# Patient Record
Sex: FEMALE | Race: WHITE | HISPANIC OR LATINO | Employment: OTHER | URBAN - METROPOLITAN AREA
[De-identification: names, ages, dates, MRNs, and addresses within clinical notes are randomized per-mention and may not be internally consistent; named-entity substitution may affect disease eponyms.]

---

## 2017-05-12 ENCOUNTER — RESOLUTE PROFESSIONAL BILLING HOSPITAL PROF FEE (OUTPATIENT)
Dept: HOSPITALIST | Facility: MEDICAL CENTER | Age: 82
End: 2017-05-12
Payer: MEDICARE

## 2017-05-12 ENCOUNTER — HOSPITAL ENCOUNTER (INPATIENT)
Facility: MEDICAL CENTER | Age: 82
LOS: 6 days | DRG: 673 | End: 2017-05-18
Attending: EMERGENCY MEDICINE | Admitting: HOSPITALIST
Payer: MEDICARE

## 2017-05-12 DIAGNOSIS — N18.9 CRF (CHRONIC RENAL FAILURE), UNSPECIFIED STAGE: ICD-10-CM

## 2017-05-12 PROBLEM — I10 HTN (HYPERTENSION): Status: ACTIVE | Noted: 2017-05-12

## 2017-05-12 PROBLEM — D61.818 PANCYTOPENIA (HCC): Status: ACTIVE | Noted: 2017-05-12

## 2017-05-12 PROBLEM — N18.6 ESRD ON HEMODIALYSIS (HCC): Status: ACTIVE | Noted: 2017-05-12

## 2017-05-12 PROBLEM — Z99.2 ESRD ON HEMODIALYSIS (HCC): Status: ACTIVE | Noted: 2017-05-12

## 2017-05-12 LAB
ALBUMIN SERPL BCP-MCNC: 3.6 G/DL (ref 3.2–4.9)
ALBUMIN/GLOB SERPL: 1.1 G/DL
ALP SERPL-CCNC: 93 U/L (ref 30–99)
ALT SERPL-CCNC: 17 U/L (ref 2–50)
ANION GAP SERPL CALC-SCNC: 12 MMOL/L (ref 0–11.9)
AST SERPL-CCNC: 18 U/L (ref 12–45)
BASOPHILS # BLD AUTO: 0.9 % (ref 0–1.8)
BASOPHILS # BLD: 0.02 K/UL (ref 0–0.12)
BILIRUB SERPL-MCNC: 0.4 MG/DL (ref 0.1–1.5)
BUN SERPL-MCNC: 113 MG/DL (ref 8–22)
CALCIUM SERPL-MCNC: 9.5 MG/DL (ref 8.5–10.5)
CHLORIDE SERPL-SCNC: 106 MMOL/L (ref 96–112)
CO2 SERPL-SCNC: 19 MMOL/L (ref 20–33)
CREAT SERPL-MCNC: 5.84 MG/DL (ref 0.5–1.4)
EOSINOPHIL # BLD AUTO: 0.11 K/UL (ref 0–0.51)
EOSINOPHIL NFR BLD: 4.4 % (ref 0–6.9)
ERYTHROCYTE [DISTWIDTH] IN BLOOD BY AUTOMATED COUNT: 47.7 FL (ref 35.9–50)
EST. AVERAGE GLUCOSE BLD GHB EST-MCNC: 134 MG/DL
GFR SERPL CREATININE-BSD FRML MDRD: 7 ML/MIN/1.73 M 2
GLOBULIN SER CALC-MCNC: 3.2 G/DL (ref 1.9–3.5)
GLUCOSE BLD-MCNC: 160 MG/DL (ref 65–99)
GLUCOSE SERPL-MCNC: 162 MG/DL (ref 65–99)
HAV IGM SERPL QL IA: NEGATIVE
HBA1C MFR BLD: 6.3 % (ref 0–5.6)
HBV CORE IGM SER QL: NEGATIVE
HBV SURFACE AB SERPL IA-ACNC: <3.1 MIU/ML (ref 0–10)
HBV SURFACE AG SER QL: NEGATIVE
HCT VFR BLD AUTO: 36.2 % (ref 37–47)
HCV AB SER QL: NEGATIVE
HGB BLD-MCNC: 11.6 G/DL (ref 12–16)
LYMPHOCYTES # BLD AUTO: 0.46 K/UL (ref 1–4.8)
LYMPHOCYTES NFR BLD: 19.1 % (ref 22–41)
MANUAL DIFF BLD: NORMAL
MCH RBC QN AUTO: 30.9 PG (ref 27–33)
MCHC RBC AUTO-ENTMCNC: 32 G/DL (ref 33.6–35)
MCV RBC AUTO: 96.5 FL (ref 81.4–97.8)
MONOCYTES # BLD AUTO: 0.19 K/UL (ref 0–0.85)
MONOCYTES NFR BLD AUTO: 7.8 % (ref 0–13.4)
MORPHOLOGY BLD-IMP: NORMAL
NEUTROPHILS # BLD AUTO: 1.63 K/UL (ref 2–7.15)
NEUTROPHILS NFR BLD: 67.8 % (ref 44–72)
NRBC # BLD AUTO: 0 K/UL
NRBC BLD AUTO-RTO: 0 /100 WBC
PHOSPHATE SERPL-MCNC: 5.4 MG/DL (ref 2.5–4.5)
PLATELET # BLD AUTO: 34 K/UL (ref 164–446)
PLATELET BLD QL SMEAR: NORMAL
PLATELETS.RETICULATED NFR BLD AUTO: 9.3 K/UL (ref 0.6–13.1)
PMV BLD AUTO: 13.7 FL (ref 9–12.9)
POTASSIUM SERPL-SCNC: 5.5 MMOL/L (ref 3.6–5.5)
PROT SERPL-MCNC: 6.8 G/DL (ref 6–8.2)
RBC # BLD AUTO: 3.75 M/UL (ref 4.2–5.4)
RBC BLD AUTO: NORMAL
SODIUM SERPL-SCNC: 137 MMOL/L (ref 135–145)
WBC # BLD AUTO: 2.4 K/UL (ref 4.8–10.8)

## 2017-05-12 PROCEDURE — 5A1D60Z PERFORMANCE OF URINARY FILTRATION, MULTIPLE: ICD-10-PCS | Performed by: INTERNAL MEDICINE

## 2017-05-12 PROCEDURE — 86706 HEP B SURFACE ANTIBODY: CPT

## 2017-05-12 PROCEDURE — 83036 HEMOGLOBIN GLYCOSYLATED A1C: CPT

## 2017-05-12 PROCEDURE — 82962 GLUCOSE BLOOD TEST: CPT

## 2017-05-12 PROCEDURE — 85027 COMPLETE CBC AUTOMATED: CPT

## 2017-05-12 PROCEDURE — 85055 RETICULATED PLATELET ASSAY: CPT

## 2017-05-12 PROCEDURE — 86480 TB TEST CELL IMMUN MEASURE: CPT

## 2017-05-12 PROCEDURE — 80074 ACUTE HEPATITIS PANEL: CPT

## 2017-05-12 PROCEDURE — 770006 HCHG ROOM/CARE - MED/SURG/GYN SEMI*

## 2017-05-12 PROCEDURE — 700101 HCHG RX REV CODE 250: Performed by: INTERNAL MEDICINE

## 2017-05-12 PROCEDURE — 99285 EMERGENCY DEPT VISIT HI MDM: CPT

## 2017-05-12 PROCEDURE — 36415 COLL VENOUS BLD VENIPUNCTURE: CPT

## 2017-05-12 PROCEDURE — 700102 HCHG RX REV CODE 250 W/ 637 OVERRIDE(OP): Performed by: HOSPITALIST

## 2017-05-12 PROCEDURE — 84100 ASSAY OF PHOSPHORUS: CPT

## 2017-05-12 PROCEDURE — 85007 BL SMEAR W/DIFF WBC COUNT: CPT

## 2017-05-12 PROCEDURE — 90935 HEMODIALYSIS ONE EVALUATION: CPT

## 2017-05-12 PROCEDURE — 99223 1ST HOSP IP/OBS HIGH 75: CPT | Mod: AI | Performed by: HOSPITALIST

## 2017-05-12 PROCEDURE — 80053 COMPREHEN METABOLIC PANEL: CPT

## 2017-05-12 RX ORDER — HEPARIN SODIUM 5000 [USP'U]/ML
5000 INJECTION, SOLUTION INTRAVENOUS; SUBCUTANEOUS EVERY 8 HOURS
Status: DISCONTINUED | OUTPATIENT
Start: 2017-05-12 | End: 2017-05-12

## 2017-05-12 RX ORDER — VENLAFAXINE 37.5 MG/1
37.5 TABLET ORAL
COMMUNITY

## 2017-05-12 RX ORDER — AMOXICILLIN 250 MG
2 CAPSULE ORAL 2 TIMES DAILY
Status: DISCONTINUED | OUTPATIENT
Start: 2017-05-12 | End: 2017-05-18 | Stop reason: HOSPADM

## 2017-05-12 RX ORDER — DEXTROSE MONOHYDRATE 25 G/50ML
25 INJECTION, SOLUTION INTRAVENOUS
Status: DISCONTINUED | OUTPATIENT
Start: 2017-05-12 | End: 2017-05-18 | Stop reason: HOSPADM

## 2017-05-12 RX ORDER — HEPARIN SODIUM 1000 [USP'U]/ML
1750 INJECTION, SOLUTION INTRAVENOUS; SUBCUTANEOUS
Status: DISCONTINUED | OUTPATIENT
Start: 2017-05-12 | End: 2017-05-12

## 2017-05-12 RX ORDER — LORAZEPAM 2 MG/ML
0.5 INJECTION INTRAMUSCULAR EVERY 6 HOURS PRN
Status: DISCONTINUED | OUTPATIENT
Start: 2017-05-12 | End: 2017-05-18 | Stop reason: HOSPADM

## 2017-05-12 RX ORDER — POLYETHYLENE GLYCOL 3350 17 G/17G
1 POWDER, FOR SOLUTION ORAL
Status: DISCONTINUED | OUTPATIENT
Start: 2017-05-12 | End: 2017-05-18 | Stop reason: HOSPADM

## 2017-05-12 RX ORDER — ACETAMINOPHEN 325 MG/1
650 TABLET ORAL EVERY 6 HOURS PRN
Status: DISCONTINUED | OUTPATIENT
Start: 2017-05-12 | End: 2017-05-18 | Stop reason: HOSPADM

## 2017-05-12 RX ORDER — LOSARTAN POTASSIUM 100 MG/1
100 TABLET ORAL DAILY
COMMUNITY
End: 2017-05-25 | Stop reason: SDUPTHER

## 2017-05-12 RX ORDER — ONDANSETRON 4 MG/1
4 TABLET, ORALLY DISINTEGRATING ORAL EVERY 4 HOURS PRN
Status: DISCONTINUED | OUTPATIENT
Start: 2017-05-12 | End: 2017-05-18 | Stop reason: HOSPADM

## 2017-05-12 RX ORDER — BISACODYL 10 MG
10 SUPPOSITORY, RECTAL RECTAL
Status: DISCONTINUED | OUTPATIENT
Start: 2017-05-12 | End: 2017-05-18 | Stop reason: HOSPADM

## 2017-05-12 RX ORDER — ONDANSETRON 2 MG/ML
4 INJECTION INTRAMUSCULAR; INTRAVENOUS EVERY 4 HOURS PRN
Status: DISCONTINUED | OUTPATIENT
Start: 2017-05-12 | End: 2017-05-18 | Stop reason: HOSPADM

## 2017-05-12 RX ORDER — ENALAPRILAT 1.25 MG/ML
1.25 INJECTION INTRAVENOUS EVERY 6 HOURS PRN
Status: DISCONTINUED | OUTPATIENT
Start: 2017-05-12 | End: 2017-05-13

## 2017-05-12 RX ORDER — LORAZEPAM 0.5 MG/1
0.5 TABLET ORAL EVERY 6 HOURS PRN
Status: DISCONTINUED | OUTPATIENT
Start: 2017-05-12 | End: 2017-05-18 | Stop reason: HOSPADM

## 2017-05-12 RX ADMIN — DEXTROSE MONOHYDRATE, SODIUM CITRATE, AND CITRIC ACID MONOHYDRATE: 2.45; 2.2; .8 INJECTION, SOLUTION INTRAVENOUS at 23:00

## 2017-05-12 ASSESSMENT — PAIN SCALES - GENERAL: PAINLEVEL_OUTOF10: 0

## 2017-05-12 NOTE — IP AVS SNAPSHOT
5/18/2017    Marcie Mccartney  [address]  [facility] [address]    Dear Ma:    [facility] wants to ensure your discharge home is safe and you or your loved ones have had all of your questions answered regarding your care after you leave the hospital.    Below is a list of resources and contact information should you have any questions regarding your hospital stay, follow-up instructions, or active medical symptoms.    Questions or Concerns Regarding… Contact   Medical Questions Related to Your Discharge  (7 days a week, 8am-5pm) Contact a Nurse Care Coordinator   [phone]   Medical Questions Not Related to Your Discharge  (24 hours a day / 7 days a week)  Contact the Nurse Health Line   [phone]    Medications or Discharge Instructions Refer to your discharge packet   or contact your [facility] Primary Care Provider   [phone]   Follow-up Appointment(s) Schedule your appointment via [employer]   or contact Scheduling [phone]   Billing Review your statement via [employer]  or contact Billing [phone]   Medical Records Review your records via [employer]   or contact Medical Records [phone]     You may receive a telephone call within two days of discharge. This call is to make certain you understand your discharge instructions and have the opportunity to have any questions answered. You can also easily access your medical information, test results and upcoming appointments via the [employer] free online health management tool. You can learn more and sign up at [employer]. For assistance setting up your [employer] account, please call [phone].    Once again, we want to ensure your discharge home is safe and that you have a clear understanding of any next steps in your care. If you have any questions or concerns, please do not hesitate to contact us, we are here for you. Thank you for choosing [facility] for your healthcare needs.    Sincerely,    Your [facility] Healthcare Team

## 2017-05-12 NOTE — ED NOTES
Pt to [facility] with family.  Pt reports pt is moving up here from [facility] and that she has not had dialysis in 1-2 weeks.

## 2017-05-12 NOTE — IP AVS SNAPSHOT
" <p align=\"LEFT\"><IMG SRC=\"//EMRWB/blob$/Images/[email or web address]\" alt=\"Image\" WIDTH=\"50%\" HEIGHT=\"200\" BORDER=\"\"></p>                   Name:Marcie Mccartney  Medical Record Number:[id number]  CSN: [id number]    YOB: 1934   Age: 83 y.o.  Sex: female  HT:1.524 m (5') WT: 69.5 kg (153 lb 3.5 oz)          Admit Date: 5/12/2017     Discharge Date:   Today's Date: 5/18/2017  Attending Doctor:  Malvin Shaffer M.D.                  Allergies:  Review of patient's allergies indicates no known allergies.          Follow-up Information     1. Follow up with Pcp Pt States None In 1 week.    Specialty:  Family Medicine        2. Follow up with Piper Holman.    Why:  First appt,  on 5/19/2015, arrive at 2:45pm for a 3:15 appt.    Contact information    [address] [place] [address]  [phone]         Medication List      Take these Medications        Instructions    losartan 100 MG Tabs   Commonly known as:  COZAAR    Take 100 mg by mouth every day.   Dose:  100 mg       venlafaxine 37.5 MG Tabs   Commonly known as:  EFFEXOR    Take 37.5 mg by mouth every bedtime.   Dose:  37.5 mg         "

## 2017-05-12 NOTE — IP AVS SNAPSHOT
Obsorb Access Code: [email or web address]  Expires: 6/17/2017  3:07 PM    Your email address is not on file at [facility].  Email Addresses are required for you to sign up for Obsorb, please contact [phone] to verify your personal information and to provide your email address prior to attempting to register for Obsorb.    Marcie Mccartney  [address] [place] [address], [place] [address]    Obsorb  A secure, online tool to manage your health information     [employer]’s Obsorb® is a secure, online tool that connects you to your personalized health information from the privacy of your home -- day or night - making it very easy for you to manage your healthcare. Once the activation process is completed, you can even access your medical information using the Obsorb toribio, which is available for free in the Apple Toribio store or Google Play store.     To learn more about Obsorb, visit [email or web address]    There are two levels of access available (as shown below):   My Chart Features  [facility] Primary Care Doctor [facility]  Specialists [facility]  Urgent  Care Non-[facility] Primary Care Doctor   Email your healthcare team securely and privately 24/7 X X X    Manage appointments: schedule your next appointment; view details of past/upcoming appointments X      Request prescription refills. X      View recent personal medical records, including lab and immunizations X X X X   View health record, including health history, allergies, medications X X X X   Read reports about your outpatient visits, procedures, consult and ER notes X X X X   See your discharge summary, which is a recap of your hospital and/or ER visit that includes your diagnosis, lab results, and care plan X X  X     How to register for [employer]:  Once your e-mail address has been verified, follow the following steps to sign up for Obsorb.     1. Go to  [email or web address]  2. Click on the Sign Up Now box, which takes you to the New Member Sign Up page. You will  need to provide the following information:  a. Enter your [employer] Access Code exactly as it appears at the top of this page. (You will not need to use this code after you’ve completed the sign-up process. If you do not sign up before the expiration date, you must request a new code.)   b. Enter your date of birth.   c. Enter your home email address.   d. Click Submit, and follow the next screen’s instructions.  3. Create a [employer] ID. This will be your [employer] login ID and cannot be changed, so think of one that is secure and easy to remember.  4. Create a [employer] password. You can change your password at any time.  5. Enter your Password Reset Question and Answer. This can be used at a later time if you forget your password.   6. Enter your e-mail address. This allows you to receive e-mail notifications when new information is available in [employer].  7. Click Sign Up. You can now view your health information.    For assistance activating your [employer] account, call [phone]

## 2017-05-12 NOTE — IP AVS SNAPSHOT
Home Care Instructions                                                                                                                  Name:Marcie Mccartney  Medical Record Number:[id number]  CSN: [id number]    YOB: 1934   Age: 83 y.o.  Sex: female  HT:1.524 m (5') WT: 69.5 kg (153 lb 3.5 oz)          Admit Date: 5/12/2017     Discharge Date:   Today's Date: 5/18/2017  Attending Doctor:  Malvin Shaffer M.D.                  Allergies:  Review of patient's allergies indicates no known allergies.            Discharge Instructions       Discharge Instructions    Discharged to home by car with relative. Discharged via wheelchair, hospital escort: Yes.  Special equipment needed: Not Applicable    Be sure to schedule a follow-up appointment with your primary care doctor or any specialists as instructed.     Discharge Plan:   Influenza Vaccine Indication: Indicated: Adults > or equal to 18 years of age with severe allergy to eggs (Flublok vaccine)    I understand that a diet low in cholesterol, fat, and sodium is recommended for good health. Unless I have been given specific instructions below for another diet, I accept this instruction as my diet prescription.   Other diet: As tolerated    Special Instructions: None    · Is patient discharged on Warfarin / Coumadin?   No     · Is patient Post Blood Transfusion?  No    Depression / Suicide Risk    As you are discharged from this [facility] facility, it is important to learn how to keep safe from harming yourself.    Recognize the warning signs:  · Abrupt changes in personality, positive or negative- including increase in energy   · Giving away possessions  · Change in eating patterns- significant weight changes-  positive or negative  · Change in sleeping patterns- unable to sleep or sleeping all the time   · Unwillingness or inability to communicate  · Depression  · Unusual sadness, discouragement and loneliness  · Talk of wanting to die  · Neglect of personal  appearance   · Rebelliousness- reckless behavior  · Withdrawal from people/activities they love  · Confusion- inability to concentrate     If you or a loved one observes any of these behaviors or has concerns about self-harm, here's what you can do:  · Talk about it- your feelings and reasons for harming yourself  · Remove any means that you might use to hurt yourself (examples: pills, rope, extension cords, firearm)  · Get professional help from the community (Mental Health, Substance Abuse, psychological counseling)  · Do not be alone:Call your Safe Contact- someone whom you trust who will be there for you.  · Call your local CRISIS HOTLINE 901-4943 or 167-505-4228  · Call your local Children's Mobile Crisis Response Team [place] (440) 895-9859 or www.Tao Sales  · Call the toll free National Suicide Prevention Hotlines   · National Suicide Prevention Lifeline 689-958-QKTC (5950)  · OneTeamVisi Line Network 800-SUICIDE (043-2133)    Hemodialysis  Dialysis is a procedure that replaces some of the work that healthy kidneys do. It is done when you lose about 85-90% of your kidney function and sometimes earlier if your symptoms may be improved by dialysis. During dialysis, wastes, salt, and extra water are removed from the blood, and the level of certain chemicals in the blood (such as potassium) is maintained. Hemodialysis is a type of dialysis in which a machine called a dialyzer is used to filter the blood. Hemodialysis is usually performed by a health care provider at a hospital or dialysis center 3 times a week for 3-5 hours during each visit. It may also be performed more frequently and for longer periods of time at home with training.  LET YOUR HEALTH CARE PROVIDER KNOW ABOUT:  · Any allergies you have.  · All medicines you are taking, including vitamins, herbs, eye drops, creams, and over-the-counter medicines.  · Any blood disorders you have.  · Other health problems you have.  RISKS AND  COMPLICATIONS  Generally, hemodialysis is a safe procedure. However, problems can occur and include:  · Low blood pressure (hypotension).  · Narrowing or ballooning of a blood vessel or bleeding at the site where blood is removed from the body and returned to the body (vascular access).  · An infection or blockage at the vascular access site.  BEFORE THE PROCEDURE  Before having hemodialysis for the first time, you will need surgery to create a site where blood can be removed from the body and returned to the body (vascular access). There are three types of vascular accesses:  · Arteriovenous fistula. This type of access is created when an artery and a vein (usually in the arm) are connected during surgery. The arteriovenous fistula takes 1-6 months to develop after surgery.  · Arteriovenous graft. This type of access is created when an artery and a vein in the arm are connected during surgery with a tube. An arteriovenous graft can be used within 2-3 weeks of surgery.  · A venous catheter. To create this type of access, a thin, flexible tube (catheter) is placed in a large vein in your neck, chest, or groin. A venous catheter can be used right away.  PROCEDURE   Hemodialysis is performed while you are sitting or reclining. During the procedure, you may sleep, read, watch television, or perform any other tasks that can be done while you are in this position. If you experience side effects or any other discomfort during the procedure, let your health care provider know. He or she may be able to make adjustments to help you feel more comfortable.  Your hemodialysis process may look something like this:  2. Your weight, blood pressure, pulse, and temperature will be measured.  3. The skin around your vascular access will be sterilized.  4. Your vascular access will be connected to the dialyzer. If your access is a fistula or graft, two needles will be inserted through the vascular access. The needles will be connected  to a plastic tube that is connected to the dialyzer. They will be taped to your skin so that they do not move out of place. If your access is a catheter, it will be connected to a plastic tube that is connected to the dialyzer.  ¨ The dialysis machine will be turned on. This will cause your blood to flow to the dialyzer. The dialyzer will filter and clean your blood before returning it to your body. While the dialysis machine is working, your blood pressure and pulse will be checked several times.  5. Once dialysis is complete, your vascular access will be disconnected from the dialyzer. If your access is a fistula or graft, the needles will be removed and a bandage (dressing) will be placed over the access.  AFTER THE PROCEDURE  · Your weight will be measured.  · Your blood may be tested to see whether your treatments are removing enough wastes. This is usually done once a month.  · You may experience or continue to experience side effects, including:  ¨ Dizziness.  ¨ Muscle cramps.  ¨ Nausea.  ¨ Headaches.  ¨ Feeling tired. Energy levels usually return to normal the day after the procedure.  ¨ Itchiness. Your health care provider may be able to prescribe medicine to relieve it.  ¨ Achy or jittery legs. You may feel like kicking your legs. This sometimes causes sleeping problems.  ¨ Allergic reaction to the chemicals used to sterilize the dialyzer.     This information is not intended to replace advice given to you by your health care provider. Make sure you discuss any questions you have with your health care provider.     Document Released: 04/14/2014 Document Revised: 01/08/2016 Document Reviewed: 04/14/2014  [employer] Interactive Patient Education ©2016 [employer]      Hemodiálisis  (Hemodialysis)  La diálisis es un procedimiento que reemplaza parte de la función que realizan los riñones sanos. Se realiza cuando se pierde alrededor del 85 % al 90 % de la función renal, y a veces antes si los síntomas pueden  mejorarse con diálisis. Kaia la diálisis, se eliminan los desechos, las sales y el exceso de agua de la lu, y se mantiene el nivel de ciertas sustancias químicas en la lu (alma el potasio). La hemodiálisis es un tipo de diálisis en la que se utiliza bhumika máquina llamada dializador para filtrar la lu. Por lo general, un médico realiza la hemodiálisis en un hospital o un centro de diálisis 3 veces a la semana, y cada sesión dura de 3 a 5 horas. Si se recibe bhumika capacitación, también puede hacerse con más frecuencia y kaia períodos más prolongados en el hogar.  INFORME A LISA MÉDICO:  · Cualquier alergia que tenga.  · Todos los medicamentos que utiliza, incluidos vitaminas, hierbas, gotas oftálmicas, cremas y medicamentos de venta karla.  · Enfermedades de la lu que tenga.  · Otros problemas de miguelina.  RIESGOS Y COMPLICACIONES  En general, la hemodiálisis es un procedimiento seguro. Sin embargo, pueden ocurrir algunos problemas, alma:  · Presión arterial baja (hipotensión).  · Estrechamiento o hinchazón de un vaso sanguíneo, o sangrado en el lugar donde la lu sale del cuerpo y vuelve a ingresar en él (acceso vascular).  · Infección u obstrucción en el lugar del acceso vascular.  ANTES DEL PROCEDIMIENTO  Antes de la primera sesión de hemodiálisis, le harán bhumika cirugía para crear un lugar en el que la lu pueda extraerse del cuerpo y volver a ingresar en él (acceso vascular). Hay guem tipos de accesos vasculares:  · Fístula arteriovenosa. Para crear machelle tipo de acceso, se conectan bhumika arteria y bhumika vena (por lo general, del brazo) kaia bhumika cirugía. La fístula arteriovenosa demora de 1 a 6 meses en formarse después de la cirugía.  · Injerto arteriovenoso. Para crear machelle tipo de acceso, se conectan bhumika arteria y bhumika vena del brazo con un tubo kaia bhumika cirugía. El injerto arteriovenoso puede usarse en el término de 2 a 3 semanas después de la cirugía.  · Catéter venoso. Para crear machelle tipo  de acceso, se coloca un tubo brito y flexible (catéter) en bhumika vena radha del jose rafael, el tórax o la emili. El catéter venoso puede usarse inmediatamente.  PROCEDIMIENTO   Para la sesión de hemodiálisis, debe estar sentado o reclinado. Kaia machelle procedimiento, puede dormir, leer, mirar televisión o realizar cualquier otra tarea que pueda hacerse en esta posición. Si tiene algún efecto secundario o cualquier otra molestia kaia el procedimiento, avísele al médico. Machelle podrá hacer ajustes que lo ayuden a sentirse más cómodo.  El proceso de hemodiálisis consistirá más o menos en lo siguiente:  6. Lo pesarán, le tomarán la presión arterial y la temperatura, y le medirán el pulso.  7. Le esterilizarán la piel de alrededor del acceso vascular.  8. El acceso vascular se conectará al dializador. Si el acceso es bhumika fístula o un injerto, se introducirán dos agujas a través del acceso vascular. Las agujas se conectarán a un tubo plástico que está conectado con el dializador y se pegarán con bhumika cinta sobre la piel para que no se muevan. Si el acceso es un catéter, machelle se conectará a un tubo plástico que está conectado con el dializador.  ¨ La máquina de diálisis se encenderá. De esta manera, la lu fluirá hacia el dializador, que la filtrará y limpiará antes de que vuelva a ingresar al cuerpo. Mientras la máquina de diálisis esté en funcionamiento, le controlarán la presión arterial y el pulso varias veces.  9. Cuando la diálisis termine, el acceso vascular se desconectará del dializador. Si el acceso es bhumika fístula o un injerto, las agujas se sacarán y se colocará bhumika venda (apósito) sobre el acceso.  DESPUÉS DEL PROCEDIMIENTO  · Lo pesarán.  · Pueden hacerle un análisis de lu para merlene si el tratamiento está eliminando la cantidad suficiente de desechos. Por lo general, esto se hace bhumika vez al mes.  · Puede tener los siguientes efectos secundarios:  ¨ Mareos.  ¨ Calambres musculares.  ¨ Náuseas.  ¨ Vania de  ele.  ¨ Cansancio. Los niveles de energía por lo general se normalizan el día después del procedimiento.  ¨ Picazón. El médico puede recetar medicamentos para calmarla.  ¨ Dolor o inquietud en las piernas. Puede sentir que las piernas patean. [place] a veces causa problemas para dormir.  ¨ Reacción alérgica a las sustancias químicas utilizadas para esterilizar el dializador.     Esta información no tiene alma fin reemplazar el consejo del médico. Asegúrese de hacerle al médico cualquier pregunta que tenga.     Document Released: 04/14/2014 Document Revised: 01/08/2016  [employer] Interactive Patient Education ©2016 [employer]      Follow-up Information     1. Follow up with Pcp Pt States None In 1 week.    Specialty:  Family Medicine        2. Follow up with Piper Holman.    Why:  First appt,  on 5/19/2015, arrive at 2:45pm for a 3:15 appt.    Contact information    [address] [place] [address]  [phone]         Discharge Medication Instructions:    Below are the medications your physician expects you to take upon discharge:    Review all your home medications and newly ordered medications with your doctor and/or pharmacist. Follow medication instructions as directed by your doctor and/or pharmacist.    Please keep your medication list with you and share with your physician.               Medication List      CONTINUE taking these medications        Instructions    Morning Afternoon Evening Bedtime    losartan 100 MG Tabs   Last time this was given:  100 mg on 5/18/2017  7:44 AM   Commonly known as:  COZAAR        Take 100 mg by mouth every day.   Dose:  100 mg                        venlafaxine 37.5 MG Tabs   Last time this was given:  37.5 mg on 5/17/2017  8:13 PM   Commonly known as:  EFFEXOR        Take 37.5 mg by mouth every bedtime.   Dose:  37.5 mg                                Instructions           Diet / Nutrition:    Follow any diet instructions given to you by your doctor or the  dietician, including how much salt (sodium) you are allowed each day.    If you are overweight, talk to your doctor about a weight reduction plan.    Activity:    Remain physically active following your doctor's instructions about exercise and activity.    Rest often.     Any time you become even a little tired or short of breath, SIT DOWN and rest.    Worsening Symptoms:    Report any of the following signs and symptoms to the doctor's office immediately:    *Pain of jaw, arm, or neck  *Chest pain not relieved by medication                               *Dizziness or loss of consciousness  *Difficulty breathing even when at rest   *More tired than usual                                       *Bleeding drainage or swelling of surgical site  *Swelling of feet, ankles, legs or stomach                 *Fever (>100ºF)  *Pink or blood tinged sputum  *Weight gain (3lbs/day or 5lbs /week)           *Shock from internal defibrillator (if applicable)  *Palpitations or irregular heartbeats                *Cool and/or numb extremities    Stroke Awareness    Common Risk Factors for Stroke include:    Age  Atrial Fibrillation  Carotid Artery Stenosis  Diabetes Mellitus  Excessive alcohol consumption  High blood pressure  Overweight   Physical inactivity  Smoking    Warning signs and symptoms of a stroke include:    *Sudden numbness or weakness of the face, arm or leg (especially on one side of the body).  *Sudden confusion, trouble speaking or understanding.  *Sudden trouble seeing in one or both eyes.  *Sudden trouble walking, dizziness, loss of balance or coordination.Sudden severe headache with no known cause.    It is very important to get treatment quickly when a stroke occurs. If you experience any of the above warning signs, call 911 immediately.                   Disclaimer         Quit Smoking / Tobacco Use:    I understand the use of any tobacco products increases my chance of suffering from future heart disease or  stroke and could cause other illnesses which may shorten my life. Quitting the use of tobacco products is the single most important thing I can do to improve my health. For further information on smoking / tobacco cessation call a Toll Free Quit Line at 1-443.102.7522 (*National [employer] [place]) or 1-202.543.3099 (American Lung Association) or you can access the web based program at www.lungusa.org.    [place] Tobacco Users Help Line:  (672) 157-3327       Toll Free: 1-285.580.7657  Quit Tobacco Program [facility] (668)911-9075    Crisis Hotline:    [place] Crisis Hotline:  9-779-TWAJAHZ or 1-956.233.9955    [place] Crisis Hotline:    1-673.905.4862 or 082-077-9068    Discharge Survey:   Thank you for choosing [facility]. We hope we did everything we could to make your hospital stay a pleasant one. You may be receiving a phone survey and we would appreciate your time and participation in answering the questions. Your input is very valuable to us in our efforts to improve our service to our patients and their families.        My signature on this form indicates that:    1. I have reviewed and understand the above information.  2. My questions regarding this information have been answered to my satisfaction.  3. I have formulated a plan with my discharge nurse to obtain my prescribed medications for home.                  Disclaimer         __________________________________                     __________       ________                       Patient Signature                                                 Date                    Time

## 2017-05-12 NOTE — ED NOTES
Bib family c/o patient needing dialysis, last dialysis 3 weeks ago, was getting 3x/week, patient c/o back of neck pain, dialysis was in [facility]

## 2017-05-13 LAB
25(OH)D3 SERPL-MCNC: 21 NG/ML (ref 30–100)
ANION GAP SERPL CALC-SCNC: 10 MMOL/L (ref 0–11.9)
BASOPHILS # BLD AUTO: 0.4 % (ref 0–1.8)
BASOPHILS # BLD: 0.01 K/UL (ref 0–0.12)
BUN SERPL-MCNC: 51 MG/DL (ref 8–22)
CALCIUM SERPL-MCNC: 9 MG/DL (ref 8.5–10.5)
CHLORIDE SERPL-SCNC: 103 MMOL/L (ref 96–112)
CO2 SERPL-SCNC: 24 MMOL/L (ref 20–33)
CREAT SERPL-MCNC: 3.34 MG/DL (ref 0.5–1.4)
EOSINOPHIL # BLD AUTO: 0.18 K/UL (ref 0–0.51)
EOSINOPHIL NFR BLD: 7.8 % (ref 0–6.9)
ERYTHROCYTE [DISTWIDTH] IN BLOOD BY AUTOMATED COUNT: 45.1 FL (ref 35.9–50)
GFR SERPL CREATININE-BSD FRML MDRD: 13 ML/MIN/1.73 M 2
GLUCOSE BLD-MCNC: 131 MG/DL (ref 65–99)
GLUCOSE BLD-MCNC: 141 MG/DL (ref 65–99)
GLUCOSE BLD-MCNC: 225 MG/DL (ref 65–99)
GLUCOSE BLD-MCNC: 84 MG/DL (ref 65–99)
GLUCOSE SERPL-MCNC: 124 MG/DL (ref 65–99)
HCT VFR BLD AUTO: 34.7 % (ref 37–47)
HGB BLD-MCNC: 11.5 G/DL (ref 12–16)
IMM GRANULOCYTES # BLD AUTO: 0 K/UL (ref 0–0.11)
IMM GRANULOCYTES NFR BLD AUTO: 0 % (ref 0–0.9)
LYMPHOCYTES # BLD AUTO: 0.43 K/UL (ref 1–4.8)
LYMPHOCYTES NFR BLD: 18.7 % (ref 22–41)
MCH RBC QN AUTO: 30.6 PG (ref 27–33)
MCHC RBC AUTO-ENTMCNC: 33.1 G/DL (ref 33.6–35)
MCV RBC AUTO: 92.3 FL (ref 81.4–97.8)
MONOCYTES # BLD AUTO: 0.2 K/UL (ref 0–0.85)
MONOCYTES NFR BLD AUTO: 8.7 % (ref 0–13.4)
NEUTROPHILS # BLD AUTO: 1.48 K/UL (ref 2–7.15)
NEUTROPHILS NFR BLD: 64.4 % (ref 44–72)
NRBC # BLD AUTO: 0 K/UL
NRBC BLD AUTO-RTO: 0 /100 WBC
PLATELET # BLD AUTO: 33 K/UL (ref 164–446)
POTASSIUM SERPL-SCNC: 4 MMOL/L (ref 3.6–5.5)
PTH-INTACT SERPL-MCNC: 514 PG/ML (ref 14–72)
RBC # BLD AUTO: 3.76 M/UL (ref 4.2–5.4)
SODIUM SERPL-SCNC: 137 MMOL/L (ref 135–145)
WBC # BLD AUTO: 2.3 K/UL (ref 4.8–10.8)

## 2017-05-13 PROCEDURE — 700102 HCHG RX REV CODE 250 W/ 637 OVERRIDE(OP): Performed by: INTERNAL MEDICINE

## 2017-05-13 PROCEDURE — A9270 NON-COVERED ITEM OR SERVICE: HCPCS | Performed by: INTERNAL MEDICINE

## 2017-05-13 PROCEDURE — 99232 SBSQ HOSP IP/OBS MODERATE 35: CPT | Performed by: INTERNAL MEDICINE

## 2017-05-13 PROCEDURE — 770006 HCHG ROOM/CARE - MED/SURG/GYN SEMI*

## 2017-05-13 PROCEDURE — 80048 BASIC METABOLIC PNL TOTAL CA: CPT

## 2017-05-13 PROCEDURE — 36415 COLL VENOUS BLD VENIPUNCTURE: CPT

## 2017-05-13 PROCEDURE — 700102 HCHG RX REV CODE 250 W/ 637 OVERRIDE(OP): Performed by: HOSPITALIST

## 2017-05-13 PROCEDURE — 83970 ASSAY OF PARATHORMONE: CPT

## 2017-05-13 PROCEDURE — 700101 HCHG RX REV CODE 250: Performed by: INTERNAL MEDICINE

## 2017-05-13 PROCEDURE — 82306 VITAMIN D 25 HYDROXY: CPT

## 2017-05-13 PROCEDURE — 82962 GLUCOSE BLOOD TEST: CPT | Mod: 91

## 2017-05-13 PROCEDURE — 85025 COMPLETE CBC W/AUTO DIFF WBC: CPT

## 2017-05-13 PROCEDURE — A9270 NON-COVERED ITEM OR SERVICE: HCPCS | Performed by: HOSPITALIST

## 2017-05-13 PROCEDURE — 90935 HEMODIALYSIS ONE EVALUATION: CPT

## 2017-05-13 RX ORDER — CALCITRIOL 0.25 UG/1
0.25 CAPSULE, LIQUID FILLED ORAL DAILY
Status: DISCONTINUED | OUTPATIENT
Start: 2017-05-13 | End: 2017-05-18 | Stop reason: HOSPADM

## 2017-05-13 RX ORDER — AMLODIPINE BESYLATE 5 MG/1
5 TABLET ORAL
Status: DISCONTINUED | OUTPATIENT
Start: 2017-05-13 | End: 2017-05-18 | Stop reason: HOSPADM

## 2017-05-13 RX ADMIN — CALCITRIOL 0.25 MCG: 0.25 CAPSULE, LIQUID FILLED ORAL at 15:45

## 2017-05-13 RX ADMIN — STANDARDIZED SENNA CONCENTRATE AND DOCUSATE SODIUM 2 TABLET: 8.6; 5 TABLET, FILM COATED ORAL at 10:09

## 2017-05-13 RX ADMIN — DEXTROSE MONOHYDRATE, SODIUM CITRATE, AND CITRIC ACID MONOHYDRATE: 2.45; 2.2; .8 INJECTION, SOLUTION INTRAVENOUS at 17:53

## 2017-05-13 RX ADMIN — AMLODIPINE BESYLATE 5 MG: 5 TABLET ORAL at 10:07

## 2017-05-13 RX ADMIN — STANDARDIZED SENNA CONCENTRATE AND DOCUSATE SODIUM 2 TABLET: 8.6; 5 TABLET, FILM COATED ORAL at 20:35

## 2017-05-13 RX ADMIN — INSULIN LISPRO 2 UNITS: 100 INJECTION, SOLUTION INTRAVENOUS; SUBCUTANEOUS at 11:05

## 2017-05-13 ASSESSMENT — ENCOUNTER SYMPTOMS
RESPIRATORY NEGATIVE: 1
LOSS OF CONSCIOUSNESS: 0
CHILLS: 0
DIZZINESS: 0
CARDIOVASCULAR NEGATIVE: 1
FEVER: 0
GASTROINTESTINAL NEGATIVE: 1
EYES NEGATIVE: 1
MUSCULOSKELETAL NEGATIVE: 1
FOCAL WEAKNESS: 0
PSYCHIATRIC NEGATIVE: 1
WEAKNESS: 1

## 2017-05-13 ASSESSMENT — LIFESTYLE VARIABLES
EVER_SMOKED: NEVER
ALCOHOL_USE: NO

## 2017-05-13 ASSESSMENT — PAIN SCALES - GENERAL
PAINLEVEL_OUTOF10: 0
PAINLEVEL_OUTOF10: 0

## 2017-05-13 NOTE — PROGRESS NOTES
2 RN skin assessment complete with JERSEY Varma. Pt's skin intact, bilat legs dry, dusky color, callous on both heels.

## 2017-05-13 NOTE — CARE PLAN
Problem: Communication  Goal: The ability to communicate needs accurately and effectively will improve  Outcome: PROGRESSING SLOWER THAN EXPECTED  Pt speaks Citizen of [place] mainly, hard to communication with pt even using language line for helping    Problem: Safety  Goal: Will remain free from injury  Outcome: PROGRESSING AS EXPECTED  Assisted pt to bathroom, bed in low position, call light within reach, bed alarm in place. Continue hourly rounding to offer assisting

## 2017-05-13 NOTE — CONSULTS
[facility] Nephrology Consult    Patient seen and examined, please see my dictated consult note for full details  83yoF with PMH significant for ESRD on HD x2 yrs, HTN, DM II, Anemia secondary to CKD, Renal Osteodystrophy admitted with complaints of neck pain and having missed outpatient x1-2 weeks.   Assessment/Plan:  1. ESRD--HD TTS as outpatient in [place] and now relocated to [place] but has not yet been accepted at outpatient dialysis in [facility] due to lack of TB test. Pt's last dialysis was 1 week ago. She has R Chest PC and no AVF or AVG, family is interest in having vascular access created at some point  --HD tonight and will likely need HD again tomorrow for clearance  --Will order quantiferon gold and hepatitis panel to aid in outpatient dialysis placement  --Pt has been tentatively accepted at [facility] Dialysis unit pending TB test results  --Dose adjust all meds for decreased GFR  --Will order vein mapping for eventual AVF or AVG creation (surgery can be done as outpatient)  2. Hyperkalemia--HD today on low K bath  3. HTN--BP elevated in ER, pt only taking losartan at home  --Gentle fluid removal with HD  --Continue home BP meds  --Monitor  4. 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 Leukopenia--white count low, unclear etiology  --Monitor  --Cx if spikes a fever  6. Thrombocytopenia--unclear etiology  --No heparin with HD  --Check manual smear to eval platelet morphology  --Transfuse platelets as needed for any signs of active bleeding  7. Anemia--secondary to CKD, goal hgb 10-11  --No need for IDALIA at this time  --Monitor  8. Renal Osteodystrophy--check phos level  9. Secondary Hyperparathyroidism--check iPTH  10. Neck Pain--pain management per primary svc  11. Mild Protein-Calorie Malnutrition--no dietary protein restrictions    Report Confirmation  #[id number]

## 2017-05-13 NOTE — PROGRESS NOTES
Hospital Medicine Progress Note, Adult, Complex               Author: Quinten Duckworthtonyahoracio Date & Time created: 5/13/2017  8:53 AM   CC: needs dialysis    Interval History:  Chart was reviewed. The pt is an 82 yo woman with ESRD. She recently moved to [place] from [place]. Apparently, she has missed her dialysis and the family brought her in to restart dialysis. Dr Green was consulted. Hemodialysis was ordered . Quantiferon and Hepatitis tests were sent.     On admission, she was noted to be pancytopenic. Heparin was held. Old records are not available. Med history is significant for HTN and DM. Pt has no other complaints.     Review of Systems:  ROS   Pertinent positives/negative as mentioned above.     A complete review of systems was done. All other systems were negative.     Physical Exam:  Physical Exam   Constitutional: Well-developed, well-nourished, (-) diaphoresis, (-) distress  HENT: Normocephalic, atraumatic, (-) R upper chest Perma cath  Eyes: PERRLA, pink conjuctivae, (-) icteric sclerae  Neck: (-) cervical lymphadenopathy, (-) rigidity  Cardiovascular: RRR, (+) murmurs, (-) rubs, (-) gallops, (-) edema  Pulmonary: Equal chest expansion, (+) clear breath sounds, (-) wheezes/rhonchi, (-) crackles/rales  Abdominal: (+) bowel sounds, soft, (-) tenderness, (-) masses, (-) guarding/rebound  Musculoskeletal: (-) joint swelling, (-) joint tenderness, (-) joint deformities, (-) muscle tenderness, (-) gross limitation of movement of 4 extremities  Neurologic: Non-focal, moves all 4 extremities, sensory grossly intact  Skin: (-) erythema, (-) warmth, (-) rashes, (-) ulcers, (-) open wounds  Psychiatric: mood/affect WNL, thought content WNL, behavior age appropriate    Labs:        Invalid input(s): [id number]      Recent Labs      05/12/17   1409  05/12/17   1704  05/13/17   0222   SODIUM  137   --   137   POTASSIUM  5.5   --   4.0   CHLORIDE  106   --   103   CO2  19*   --   24   BUN  113*   --   51*   CREATININE   5.84*   --   3.34*   PHOSPHORUS   --   5.4*   --    CALCIUM  9.5   --   9.0     Recent Labs      17   ALTSGPT  17   --    ASTSGOT  18   --    ALKPHOSPHAT  93   --    TBILIRUBIN  0.4   --    GLUCOSE  162*  124*     Recent Labs      17   RBC  3.75*  3.76*   HEMOGLOBIN  11.6*  11.5*   HEMATOCRIT  36.2*  34.7*   PLATELETCT  34*  33*     Recent Labs      17   WBC  2.4*  2.3*   NEUTSPOLYS  67.80  64.40   LYMPHOCYTES  19.10*  18.70*   MONOCYTES  7.80  8.70   EOSINOPHILS  4.40  7.80*   BASOPHILS  0.90  0.40   ASTSGOT  18   --    ALTSGPT  17   --    ALKPHOSPHAT  93   --    TBILIRUBIN  0.4   --            Hemodynamics:  Temp (24hrs), Av.6 °C (97.9 °F), Min:36.2 °C (97.2 °F), Max:36.9 °C (98.4 °F)  Temperature: 36.8 °C (98.2 °F)  Pulse  Av.3  Min: 58  Max: 64   Blood Pressure : (!) 175/64 mmHg (RN notified)     Respiratory:    Respiration: 16, Pulse Oximetry: 98 %        RUL Breath Sounds: Clear, RML Breath Sounds: Diminished, RLL Breath Sounds: Diminished, KATHARINE Breath Sounds: Clear, LLL Breath Sounds: Diminished  Fluids:    Intake/Output Summary (Last 24 hours) at 17 0853  Last data filed at 17 2255   Gross per 24 hour   Intake   1000 ml   Output   1300 ml   Net   -300 ml     Weight: 53.3 kg (117 lb 8.1 oz)  GI/Nutrition:  Orders Placed This Encounter   Procedures   • Diet Order     Standing Status: Standing      Number of Occurrences: 1      Standing Expiration Date:      Order Specific Question:  Diet:     Answer:  Renal [8]     Medical Decision Making, by Problem:  Active Hospital Problems    Diagnosis   • HTN (hypertension) [I10]   • ESRD on hemodialysis (CMS-HCC) [N18.6, Z99.2]  Hyperhkalemia  DC Enalapril  Start norvasc 5 daily  Hemodialysis     • Pancytopenia (CMS-HCC) [D61.818]  Get old records  Consult Dr. Mckeon from hematology    Heart murmur  Echocardiogram       Core Measures    Full code  SCD

## 2017-05-13 NOTE — ED NOTES
Med rec complete per pt RX bottles at bedside  RX bottles returned   NKDA  No oral ABX within last 30 days

## 2017-05-13 NOTE — PROGRESS NOTES
Pt's , want to take insulin after dialysis, Pt off floor to dialysis with transport via hospital bed

## 2017-05-13 NOTE — PROGRESS NOTES
This RN cannot complete pt's admit profile. Pt speaks Uruguayan only and does not know past medical history. Pt wants to wait for her daughter for helping.

## 2017-05-13 NOTE — PROGRESS NOTES
[facility] Nephrology Progress Note               Author: Norma Maxwell M.D. Date & Time created: 5/13/2017  2:54 PM     Interval History:  This is an 83-year-old  female with a past medical history significant for end-stage renal disease, on chronic hemodialysis for the past 2 years, hypertension, diabetes mellitus type 2,   anemia secondary to chronic kidney disease, renal osteodystrophy, depression, who was admitted with complaints of neck pain and having missed outpatient hemodialysis for the past 2 weeks.  Patient is primarily Belgian-speaking.  The history was obtained with the help of her family who is at bedside who were able to translate.  The patient recently moved from [place] to [place] to live with her daughter.  She has been tentatively accepted at outpatient dialysis at [facility]; however, they were unable to accept her and confirmed her chair because of the lack of a tuberculosis test results that they require.  Patient had last dialyzed about 1 week ago when she was living in [place], although this time line is somewhat uncertain as she was living with her brother in [place] and it is unclear whether she was dialyzed 1 week or a few days ago.  At any rate, she was unable to be dialyzed as an outpatient today when she presented to the [place] [facility] because   of lack of the tuberculosis test and so she was brought to the emergency room for further evaluation since she has missed her dialysis for a week as well as because of her neck pain.  Labs in the emergency room revealed an elevated potassium of 5.5 and an elevated BUN of 113.  Patient reports that she is feeling okay.  She denies any chest pain or shortness of breath.  No lightheadedness.  She is not having any lower extremity edema.  According to her family, patient has stopped many of her medications.  She is only taking losartan for her blood pressure as well as venlafaxine for depression.   She has a history of diabetes mellitus type 2 that ultimately led to her end-stage renal disease, but according to her family, she is not taking any pills or insulin at this time.  She is also dialyzing via a right chest PermCath and according to the family, she has had a catheter in for about 2 years.  There    have been no attempts to create a vascular access such as an AV fistula or an AV graft according to her family at bedside.  They would like to proceed with vascular access creation if possible.    DAILY NEPHROLOGY SUMMARY  5/13/17--No events, dialyzed yesterday and feels better today, no neck pain today, BP elevated this am, BUN improved after HD yesterday, only tolerated 300cc fluid removal with HD yesterday due to cramping    Review of Systems:  Review of Systems   Constitutional: Negative for fever and chills.   HENT: Negative.    Eyes: Negative.    Respiratory: Negative.    Cardiovascular: Negative.    Gastrointestinal: Negative.    Genitourinary: Negative.    Musculoskeletal: Negative.    Skin: Negative.    Neurological: Positive for weakness. Negative for dizziness, focal weakness and loss of consciousness.   Endo/Heme/Allergies: Negative.    Psychiatric/Behavioral: Negative.        Physical Exam:  Physical Exam   Constitutional: She is oriented to person, place, and time. She appears well-developed. No distress.   +Frail appearance   HENT:   Head: Normocephalic and atraumatic.   Mouth/Throat: No oropharyngeal exudate.   Eyes: Conjunctivae and EOM are normal. Pupils are equal, round, and reactive to light. No scleral icterus.   Neck: Normal range of motion. Neck supple. No thyromegaly present.   Cardiovascular: Normal rate and regular rhythm.    No murmur heard.  Pulmonary/Chest: Effort normal and breath sounds normal. No respiratory distress.   +R Chest PC   Abdominal: Soft. Bowel sounds are normal. She exhibits no distension.   Musculoskeletal: Normal range of motion. She exhibits no edema or  tenderness.   Neurological: She is alert and oriented to person, place, and time. She exhibits normal muscle tone.   Skin: Skin is warm and dry. No erythema.   Psychiatric: Her speech is normal. Her affect is blunt.   Nursing note and vitals reviewed.      Labs:        Invalid input(s): [id number]      Recent Labs      17   1409  17   1704  17   SODIUM  137   --   137   POTASSIUM  5.5   --   4.0   CHLORIDE  106   --   103   CO2  19*   --   24   BUN  113*   --   51*   CREATININE  5.84*   --   3.34*   PHOSPHORUS   --   5.4*   --    CALCIUM  9.5   --   9.0     Recent Labs      17   14017   022   ALTSGPT  17   --    ASTSGOT  18   --    ALKPHOSPHAT  93   --    TBILIRUBIN  0.4   --    GLUCOSE  162*  124*     Recent Labs      17   RBC  3.75*  3.76*   HEMOGLOBIN  11.6*  11.5*   HEMATOCRIT  36.2*  34.7*   PLATELETCT  34*  33*     Recent Labs      17   14017   WBC  2.4*  2.3*   NEUTSPOLYS  67.80  64.40   LYMPHOCYTES  19.10*  18.70*   MONOCYTES  7.80  8.70   EOSINOPHILS  4.40  7.80*   BASOPHILS  0.90  0.40   ASTSGOT  18   --    ALTSGPT  17   --    ALKPHOSPHAT  93   --    TBILIRUBIN  0.4   --            Hemodynamics:  Temp (24hrs), Av.6 °C (97.9 °F), Min:36.2 °C (97.2 °F), Max:36.9 °C (98.4 °F)  Temperature: 36.8 °C (98.2 °F)  Pulse  Av.3  Min: 58  Max: 64   Blood Pressure : (!) 175/64 mmHg (RN notified)     Respiratory:    Respiration: 16, Pulse Oximetry: 98 %        RUL Breath Sounds: Clear, RML Breath Sounds: Diminished, RLL Breath Sounds: Diminished, KATHARINE Breath Sounds: Clear, LLL Breath Sounds: Diminished  Fluids:    Intake/Output Summary (Last 24 hours) at 17 1454  Last data filed at 17 2255   Gross per 24 hour   Intake   1000 ml   Output   1300 ml   Net   -300 ml        GI/Nutrition:  Orders Placed This Encounter   Procedures   • Diet Order     Standing Status: Standing      Number of Occurrences: 1       Standing Expiration Date:      Order Specific Question:  Diet:     Answer:  Renal [8]     Medical Decision Making, by Problem:  Active Hospital Problems    Diagnosis   • HTN (hypertension) [I10]   • ESRD on hemodialysis (CMS-HCC) [N18.6, Z99.2]   • Pancytopenia (CMS-[facility]) [D61.818]     ASSESSMENT/PLAN:  1. ESRD--HD TTS as outpatient in [place] and now relocated to [place] but has not yet been accepted at outpatient dialysis in [facility] due to lack of TB test. Pt's last dialysis was 1 week ago. She has R Chest PC and no AVF/AVG, family is interest in having vascular access created at some point  --HD again today for clearance and then qMWF dialysis schedule  --Quantiferon Gold test pending, await final results to send to outpatient dialysis unit at [facility] for acceptance  --Pt has been tentatively accepted at [facility] pending TB test results  --Dose adjust all meds for decreased GFR  --Ordered vein mapping for eventual AVF or AVG creation (surgery can be done as outpatient)  2. Hyperkalemia--resolved with HD  3. HTN--BP this am, did not tolerate much fluid removal with HD due to cramping and pt does not appear overtly fluid overloaded  --OK to continue home dose of losartan now that she is back on regular HD  --Amlodipine started  --Monitor  4. 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 Leukopenia--white count low, unclear etiology  --Hematology consulted  --Cx if spikes a fever  6. Thrombocytopenia--unclear etiology, not using heparin with HD, last HD was 1 week ago and unclear if she had been receiving heparin at her prior dialysis unit in [place]  --No heparin with HD  --Lock Dialysis catheter with ACD Citrate  --Hematology Consulted  --Transfuse platelets as needed for any signs of active bleeding  7. Anemia--secondary to CKD, goal hgb 10-11  --No  need for IDALIA at this time  --Monitor  8. Renal Osteodystrophy--phos level stable  --Low phos diet  9. Secondary Hyperparathyroidism--iPTH elevated  --Start calcitriol  10. Neck Pain--resolved, pain management per primary svc  11. Mild Protein-Calorie Malnutrition--no dietary protein restrictions    **Discussed with grand-daughter at bedside    Medications reviewed, Labs reviewed and Radiology images reviewed

## 2017-05-13 NOTE — PROGRESS NOTES
Pt back to floor from dialysis via hospital bed. Received report from RN dialysis. No c/o pain, AxOx4. Pt speaks Faroese mainly. Pt refused taking insulin. Pt resting, bed alarm in place, bed in low position, call light within reach.

## 2017-05-13 NOTE — ED PROVIDER NOTES
ED Provider Note    CHIEF COMPLAINT  Chief Complaint   Patient presents with   • Other     needs dialysis       HPI  Marcie Mccartney is a 83 y.o. female who presents to the emergency department because she needs dialysis. The patient has a history of dialysis-dependent renal failure and usually gets her dialysis in [facility] on Tuesdays and Thursdays and Saturdays. The patient just moved to [place] to live with family members and was scheduled to have dialysis at [place] [facility] today but when the family took her in a told that she could not have dialysis today because she had not had a TB skin test. The family was advised to bring the patient to the emergency department. The patient's last dialysis was last Thursday. The patient feels well she does not have any specific complaints. The patient speaks Portuguese and her daughters are interpreting for her.     REVIEW OF SYSTEMS no shortness of breath no chest pain no nausea or vomiting. All other systems negative    PAST MEDICAL HISTORY  No past medical history on file.    FAMILY HISTORY  No family history on file.    Social history  No smoking or alcohol or drug abuse    SURGICAL HISTORY  No past surgical history on file.    CURRENT MEDICATIONS  Home Medications     Reviewed by Angélica Turner, Pharmacy Int (Pharmacy Intern) on 05/12/17 at 0978  Med List Status: Complete    Medication Last Dose Status    losartan (COZAAR) 100 MG Tab  Active    venlafaxine (EFFEXOR) 37.5 MG Tab  Active                ALLERGIES  Not on File    PHYSICAL EXAM  VITAL SIGNS: /76 mmHg  Pulse 59  Temp(Src) 36.8 °C (98.2 °F)  Resp 16  Ht 1.524 m (5')  Wt 53.3 kg (117 lb 8.1 oz)  BMI 22.95 kg/m2  SpO2 96%   Oxygen saturation is interpreted as adequate  Constitutional: Awake and verbal and nontoxic appearing  HENT: Mucous membranes are moist  Eyes: No erythema or discharge or jaundice  Neck: Trachea midline no JVD  Cardiovascular: Regular bradycardia  Lungs: Clear and equal  bilaterally with no apparent difficulty breathing  Chest wall: There is a dialysis catheter in the right upper chest wall  Abdomen/Back: Soft nontender nondistended no peritoneal findings  Skin: Warm and dry  Musculoskeletal: No leg edema or calf tenderness  Neurologic: Awake and verbal and moving all extremities    Laboratory  A CBC shows a low white blood count of 2.4, hemoglobin is 11.6 and platelet count is low at 34. Basic metabolic panel shows an elevated BUN of 113 with creatinine of 5.84. The bicarb is low at 19 the potassium is 5.3 the blood sugar is 162. LFTs were unremarkable.    MEDICAL DECISION MAKING and DISPOSITION  In the emergency department the patient generally looks well and in no distress. I reviewed the case with Dr. Anthony for nephrology consultation and she will arrange dialysis and further necessary lab testing for this patient and arrange for future dialysis at [facility]. I reviewed the case with the hospitalist and the patient will be admitted until these things can be accomplished.    IMPRESSION  1. Chronic renal failure requiring dialysis  2. Thrombocytopenia  3. Low white blood cell count         Electronically signed by: Ryan Rajan, 5/12/2017 8:08 PM

## 2017-05-13 NOTE — CONSULTS
DATE OF SERVICE:  05/12/2017    DATE OF ADMISSION:  5/12/2017    DATE OF CONSULTATION:  5/12/2017    REQUESTING PHYSICIAN:  Dr. Rajan (ER physician).    REASON FOR CONSULTATION:  Evaluation and management of end-stage renal disease   and hyperkalemia.    HISTORY OF PRESENT ILLNESS:  This is an 83-year-old  female with a   past medical history significant for end-stage renal disease, on chronic   hemodialysis for the past 2 years, hypertension, diabetes mellitus type 2,   anemia secondary to chronic kidney disease, renal osteodystrophy, depression,   who was admitted with complaints of neck pain and having missed outpatient   hemodialysis for the past 2 weeks.  Patient is primarily Namibian-speaking.    The history was obtained with the help of her family who is at bedside who   were able to translate.  The patient recently moved from [place] to [place]   to live with her daughter.  She has been tentatively accepted at outpatient   dialysis at [facility]; however, they were unable to accept   her and confirmed her chair because of the lack of a tuberculosis test results   that they require.  Patient had last dialyzed about 1 week ago when she was   living in [place], although this time line is somewhat uncertain as she   was living with her brother in [place] and it is unclear whether she was   dialyzed 1 week or a few days ago.  At any rate, she was unable to be dialyzed   as an outpatient today when she presented to the [place] [facility] because   of lack of the tuberculosis test and so she was brought to the emergency room   for further evaluation since she has missed her dialysis for a week as well   as because of her neck pain.  Labs in the emergency room revealed an elevated   potassium of 5.5 and an elevated BUN of 113.  Patient reports that she is   feeling okay.  She denies any chest pain or shortness of breath.  No   lightheadedness.  She is not having any lower  extremity edema.  According to   her family, patient has stopped many of her medications.  She is only taking   losartan for her blood pressure as well as venlafaxine for depression.  She   has a history of diabetes mellitus type 2 that ultimately led to her end-stage   renal disease, but according to her family, she is not taking any pills or   insulin at this time.  She is also dialyzing via a right chest PermCath and   according to the family, she has had a catheter in for about 2 years.  There   have been no attempts to create a vascular access such as an AV fistula or an   AV graft according to her family at bedside.  They would like to proceed with   vascular access creation if possible.    REVIEW OF SYSTEMS:  Significant for neck pain.  Patient is also complaining of   some back pain.  She denies any chest pain, shortness of breath,   lightheadedness, or dizziness.  She reports that her appetite is a little bit   decreased.  She has no nausea or vomiting, diarrhea or constipation.  She   denies any lower extremity edema.  The rest of her 12-point review of systems   is negative.    PAST MEDICAL HISTORY:  1.  End-stage renal disease.  Patient has been dialysis dependent for the past   2 years.  She is currently dialyzing via a right chest PermCath.  According   to her family, there have not been any plans for AV fistula creation or ABG   creation when she was living in [place].  She has now relocated to [facility].  2.  Hypertension.  3.  Diabetes mellitus type 2.  This is the etiology for her end-stage renal   disease.  Patient is currently not taking any oral medications or insulin for   her diabetes according to the family.  4.  Anemia secondary to chronic kidney disease.  5.  Renal osteodystrophy.  6.  Depression.    PAST SURGICAL HISTORY:  The patient and her family deny any prior surgeries.    ALLERGIES:  No known medical allergies.    SOCIAL HISTORY:  The patient recently relocated from [facility]   to the [facility].  She is currently living with her daughter.  She   denies any smoking, alcohol, or illicit drug use.    FAMILY HISTORY:  There is no family history of kidney disease or relatives on   dialysis.    OUTPATIENT MEDICATIONS:  1.  Venlafaxine.  2.  Losartan 50 mg daily.    PHYSICAL EXAMINATION:  VITAL SIGNS:  Temperature is 36.8 degrees Celsius, pulse 59, respirations 16,   blood pressure 188/76, and satting 96% on room air.  GENERAL:  The patient is alert and oriented x3 in no acute distress.  She does   appear frail and elderly.  HEENT:  Pupils are equal, round, and reactive to light.  Extraocular muscles   are intact.  Mucous membranes appear moist.  NECK:  Reveals mild JVD to about 5 cm above the sternal angle.  There is no   thyromegaly.  Trachea is midline.  CARDIOVASCULAR:  Heart is regular rate and rhythm.  No murmurs, rubs, or   gallops.  RESPIRATORY:  Lungs are with decreased breath sounds at the bilateral bases.    There are no crackles or rhonchi.  There is no tachypnea and there is no   increased work of breathing.  GASTROINTESTINAL:  Abdomen is soft, nontender, and nondistended.  Bowel sounds   are present.  EXTREMITIES:  Reveals no clubbing or cyanosis.  There is some trace pedal   edema.  SKIN:  Reveals no rashes or ulcerations.  There is normal skin turgor.  There   is a right chest PermCath in place.  There is no exit site erythema.  NEUROLOGIC:  Reveals no focal motor deficits.  Sensation is grossly intact to   light touch.  LYMPHATIC:  There is no cervical lymphadenopathy.  No axillary   lymphadenopathy.  PSYCHIATRIC:  Patient is alert and oriented x3.  She has a flat affect.    LABORATORY DATA:  Labs reveal a white blood cell count of 2.4, hemoglobin is   11.6, and platelet count is 34.  Differential reveals 67% neutrophils, 19%   lymphocytes, and absolute neutrophil count is 1630.  Chemistries reveal a   sodium of 137, potassium 5.5, chloride is 106,  bicarbonate is 19, BUN is 113,   creatinine is 5.84, and calcium is 9.5.  AST is 18, ALT is 17, alkaline   phosphatase is 93, and albumin is 3.6.    ASSESSMENT AND PLAN:  1.  End-stage renal disease.  Patient dialyzes on Tuesday, Thursday, and   Saturdays as an outpatient in [place].  She is now relocated to the [facility], but has not yet been formally accepted at her outpatient dialysis   unit in [facility] due to the lack of a tuberculosis test that is   required per their regulations.  Patient's last dialysis was about 1 week ago,   although this is uncertain according to her family.  She currently has a   right chest PermCath and does not have an AV fistula or an AV graft in place,   but her amily, is interested in her having a vascular access created at some   point.  We will plan for hemodialysis tonight and she will likely need   hemodialysis again tomorrow for clearance.  We will order a QuantiFERON Gold   and hepatitis panel to aid in outpatient dialysis placement.  Patient has been   tentatively accepted at [facility] pending the results of   her tuberculosis test.  Recommend dose adjusting all of medications for her   decreased glomerular filtration rate.  We will order vein mapping for ventral   arteriovenous fistula or arteriovenous graft creation, which can be done as an   outpatient.  2.  Hyperkalemia.  We will plan on hemodialysis today on a low potassium bath.  3.  Hypertension.  Blood pressure is elevated in the emergency room.    According to her family, patient is only taking losartan at home for blood   pressure.  Recommend gentle fluid removal with hemodialysis today.  Continue   home blood pressure medicines and continue to monitor.  4.  Diabetes mellitus type 2.  According to her family, patient has diabetic   nephropathy, which led to her end-stage renal disease.  She is currently not   taking any medications for diabetes including no oral medications or  insulin   according to her family at bedside.  Recommend monitoring her blood sugars.    Patient may need coverage with insulin.  Further management per the primary   service.  5.  Leukopenia.  White count is low.  This was of unclear etiology.  Monitor   for now and culture if the patient spikes a fever.  6.  Thrombocytopenia.  Again, this is of uncertain etiology.  Recommend   avoiding any heparin products at this time.  Consider ordering a manual   peripheral smear to evaluate port platelet morphology.  We will not use any   heparin with hemodialysis.  7.  Anemia, likely secondary to chronic kidney disease.  Goal hemoglobin is   between 10-11.  There is no need for erythrocyte stimulating agent at this   time.  Continue to monitor.  8.  Renal osteodystrophy.  Recommend checking a phosphorus level.  Patient is   not on any phosphorus binders at home at this time.  9.  Secondary hyperparathyroidism.  Recommend checking an intact PTH level.  10.  Neck pain.  Pain Management per the primary service.  11.  Mild protein-calorie malnutrition.  Recommend no dietary protein   restrictions at this time.    Thank you for this very interesting consult and thank you for involving me in   the care of this very pleasant patient.  If you have any questions or need any   further information, please do not hesitate to contact me.       ____________________________________     MD EMILE GUERRA / VIDAL    DD:  05/12/2017 17:37:28  DT:  05/12/2017 21:21:10    D#:  [id number]  Job#:  [id number]

## 2017-05-13 NOTE — PROGRESS NOTES
Lola, from Lab called with critical result of WBC at 2.3 and platelet at 33. Critical lab result read back to Lola.   OREN Stewart notified of critical lab result at 0410.  Critical lab result read back by HUMA. 0448.

## 2017-05-13 NOTE — CONSULTS
[id number]    1.  Pancytopenia.  Normocytic.  No B symptoms.  Will check B12, folic acid, TSH, HIV and abdominal ultrasound.  If these don't reveal a cause will need bone marrow biopsy.  2.  ESRD on HD  3.  HTN, poorly controlled  4.  Diabetes mellitus.     Nathalie Yadav MD  [facility]  [phone]

## 2017-05-13 NOTE — H&P
DATE OF ADMISSION:  05/12/2017.    PRIMARY CARE PHYSICIAN:  The patient does not have a PCP.    CHIEF COMPLAINT:  She needs dialysis.    HISTORY OF PRESENT ILLNESS:  Patient is an 83-year-old  female who is   Armenian speaking only.  Family is at bedside and they are very poor   historians.  The only history I can obtain from them is that she has a history   of end-stage renal disease for which she has been on dialysis over the last 2   years on Tuesdays, Thursdays and Saturdays.  She recently moved here from [place] and she wanted to establish care for hemodialysis.  She went to a   clinic today, but because there is no tuberculosis test she was refused   dialysis; therefore, was brought to the hospital for evaluation.  Family   reports that she has a history of hypertension and they feel that she was on   dialysis because of diabetes, but she states she has no longer diabetes.  She   is currently without any complaints and states that she does not want to be in   the hospital and would like to go home.  She cannot tell me if she has had   any surgeries or not.    REVIEW OF SYSTEMS:  Limited secondary to lack of knowledge of her medical   condition as well as language barrier.    PAST MEDICAL HISTORY:  1.  End-stage renal disease, on hemodialysis Tuesday, Thursday and Saturday.  2.  History of hypertension.  3.  History of diabetes according to her family.    PAST SURGICAL HISTORY:  She states none.  She does have a right-sided   hemodialysis catheter, which has apparently been in place for 2 years.    SOCIAL HISTORY:  Negative for tobacco, drugs, or alcohol use.    FAMILY HISTORY:  Unable to obtain as the patient does not know.    ALLERGIES:  No known drug allergies.    HOME MEDICATIONS:  Effexor ER 37.5 mg at bedtime and Cozaar.    PHYSICAL EXAMINATION:  VITAL SIGNS:  Blood pressure is 180/76, pulse of 59, respirations 16,   temperature 36.8, weight 53.3 kg, and oxygen saturation is 96% on room  air.  GENERAL:  Patient appears chronically ill, currently she is not in any acute   distress.  HEENT:  Dry mucous membranes.  No oropharyngeal exudates.  Eyes, EOMI, PERRLA.  NECK:  No lymphadenopathy, no JVD.  CARDIOVASCULAR:  Bradycardic, regular rhythm.  There is a positive murmur.  LUNGS:  Clear to auscultation bilaterally.  No rales or rhonchi.  CHEST WALL:  She has right-sided chest wall hemodialysis catheter in place.  ABDOMEN:  Positive bowel sounds, soft, nontender, and nondistended.  EXTREMITIES:  No clubbing or cyanosis.  NEUROLOGICAL:  She is awake and alert, follows commands.    IMAGING:  None.    LABORATORY DATA:  WBC is 2.4, hemoglobin 11.6, hematocrit 36.2, and platelets   of _____.  Sodium is 137, potassium 5.5, BUN is 113, creatinine 5.84, and   glucose 162.  Phosphorus is 5.4.    ASSESSMENT AND PLAN:  The patient is an 83-year-old female that presents for   hemodialysis.  1.  End-stage renal disease, on hemodialysis.  Dr. Norma Maxwell with   nephrology has been consulted.  The patient has been scheduled to receive   hemodialysis tonight as well as tomorrow, QuantiFERON gold and hepatitis panel   has been ordered by Dr. Maxwell to assist in outpatient dialysis   placement.  Patient also need vein mapping.  2.  Hyperkalemia.  We will defer this to nephrology.  3.  History of hypertension.  We will continue with losartan and she may need   to have additional medications added at some point, but there is likely some   contribution from fluid overload secondary to her not going to dialysis, so we   will reevaluate after she receives her hemodialysis.  4.  History of type 2 diabetes.  We will monitor her blood glucose level.  She   is currently not on any medications for this at home.  5.  Depression.  Continue with Effexor.  6.  Pancytopenia, etiology is not clear.  I am not sure what her baseline is.    We are obviously going to hold her heparin.  We will try to obtain records   from  [place], but the family and the patient did not know where.  8.  She is a FULL CODE.       ____________________________________     MD ZEESHAN Kumar / VIDAL    DD:  05/12/2017 20:23:17  DT:  05/12/2017 21:10:01    D#:  [id number]  Job#:  [id number]

## 2017-05-13 NOTE — PROGRESS NOTES
Hemodialysis ordered by Dr. Maxwell. Treatment started at 2025 and ended at 2255. Pt c/o cramping lt LE during tx. NS bolus 500 ml, provided good relief. Pt stable, vss, no c/o post tx. See flow sheets for details. Net  ml. Report to ELLYN Gutierrez RN.

## 2017-05-14 ENCOUNTER — APPOINTMENT (OUTPATIENT)
Dept: RADIOLOGY | Facility: MEDICAL CENTER | Age: 82
DRG: 673 | End: 2017-05-14
Attending: INTERNAL MEDICINE
Payer: MEDICARE

## 2017-05-14 LAB
ANION GAP SERPL CALC-SCNC: 9 MMOL/L (ref 0–11.9)
BASOPHILS # BLD AUTO: 0.6 % (ref 0–1.8)
BASOPHILS # BLD: 0.02 K/UL (ref 0–0.12)
BUN SERPL-MCNC: 37 MG/DL (ref 8–22)
CALCIUM SERPL-MCNC: 9.4 MG/DL (ref 8.5–10.5)
CHLORIDE SERPL-SCNC: 101 MMOL/L (ref 96–112)
CO2 SERPL-SCNC: 25 MMOL/L (ref 20–33)
CREAT SERPL-MCNC: 3.24 MG/DL (ref 0.5–1.4)
EOSINOPHIL # BLD AUTO: 0.31 K/UL (ref 0–0.51)
EOSINOPHIL NFR BLD: 9.5 % (ref 0–6.9)
ERYTHROCYTE [DISTWIDTH] IN BLOOD BY AUTOMATED COUNT: 45.8 FL (ref 35.9–50)
FOLATE SERPL-MCNC: 7.8 NG/ML
GFR SERPL CREATININE-BSD FRML MDRD: 14 ML/MIN/1.73 M 2
GLUCOSE BLD-MCNC: 104 MG/DL (ref 65–99)
GLUCOSE BLD-MCNC: 107 MG/DL (ref 65–99)
GLUCOSE BLD-MCNC: 141 MG/DL (ref 65–99)
GLUCOSE BLD-MCNC: 201 MG/DL (ref 65–99)
GLUCOSE SERPL-MCNC: 113 MG/DL (ref 65–99)
HCT VFR BLD AUTO: 36.7 % (ref 37–47)
HGB BLD-MCNC: 12.2 G/DL (ref 12–16)
HIV 1+2 AB+HIV1 P24 AG SERPL QL IA: NON REACTIVE
IMM GRANULOCYTES # BLD AUTO: 0.01 K/UL (ref 0–0.11)
IMM GRANULOCYTES NFR BLD AUTO: 0.3 % (ref 0–0.9)
LV EJECT FRACT  99904: 65
LV EJECT FRACT MOD 2C 99903: 57.54
LV EJECT FRACT MOD 4C 99902: 59.39
LV EJECT FRACT MOD BP 99901: 59.38
LYMPHOCYTES # BLD AUTO: 0.61 K/UL (ref 1–4.8)
LYMPHOCYTES NFR BLD: 18.7 % (ref 22–41)
MCH RBC QN AUTO: 31.1 PG (ref 27–33)
MCHC RBC AUTO-ENTMCNC: 33.2 G/DL (ref 33.6–35)
MCV RBC AUTO: 93.6 FL (ref 81.4–97.8)
MONOCYTES # BLD AUTO: 0.33 K/UL (ref 0–0.85)
MONOCYTES NFR BLD AUTO: 10.1 % (ref 0–13.4)
NEUTROPHILS # BLD AUTO: 1.98 K/UL (ref 2–7.15)
NEUTROPHILS NFR BLD: 60.8 % (ref 44–72)
NRBC # BLD AUTO: 0 K/UL
NRBC BLD AUTO-RTO: 0 /100 WBC
PLATELET # BLD AUTO: 45 K/UL (ref 164–446)
PMV BLD AUTO: 13.2 FL (ref 9–12.9)
POTASSIUM SERPL-SCNC: 3.8 MMOL/L (ref 3.6–5.5)
RBC # BLD AUTO: 3.92 M/UL (ref 4.2–5.4)
SODIUM SERPL-SCNC: 135 MMOL/L (ref 135–145)
TSH SERPL DL<=0.005 MIU/L-ACNC: 5.43 UIU/ML (ref 0.3–3.7)
VIT B12 SERPL-MCNC: 852 PG/ML (ref 211–911)
WBC # BLD AUTO: 3.3 K/UL (ref 4.8–10.8)

## 2017-05-14 PROCEDURE — 84443 ASSAY THYROID STIM HORMONE: CPT

## 2017-05-14 PROCEDURE — 87389 HIV-1 AG W/HIV-1&-2 AB AG IA: CPT

## 2017-05-14 PROCEDURE — 93970 EXTREMITY STUDY: CPT | Mod: 26 | Performed by: SURGERY

## 2017-05-14 PROCEDURE — 93970 EXTREMITY STUDY: CPT

## 2017-05-14 PROCEDURE — 700111 HCHG RX REV CODE 636 W/ 250 OVERRIDE (IP): Performed by: HOSPITALIST

## 2017-05-14 PROCEDURE — A9270 NON-COVERED ITEM OR SERVICE: HCPCS | Performed by: INTERNAL MEDICINE

## 2017-05-14 PROCEDURE — 82962 GLUCOSE BLOOD TEST: CPT

## 2017-05-14 PROCEDURE — 99231 SBSQ HOSP IP/OBS SF/LOW 25: CPT | Performed by: INTERNAL MEDICINE

## 2017-05-14 PROCEDURE — 82746 ASSAY OF FOLIC ACID SERUM: CPT

## 2017-05-14 PROCEDURE — 93306 TTE W/DOPPLER COMPLETE: CPT | Mod: 26 | Performed by: INTERNAL MEDICINE

## 2017-05-14 PROCEDURE — 80048 BASIC METABOLIC PNL TOTAL CA: CPT

## 2017-05-14 PROCEDURE — 93306 TTE W/DOPPLER COMPLETE: CPT

## 2017-05-14 PROCEDURE — 700102 HCHG RX REV CODE 250 W/ 637 OVERRIDE(OP): Performed by: HOSPITALIST

## 2017-05-14 PROCEDURE — 700102 HCHG RX REV CODE 250 W/ 637 OVERRIDE(OP): Performed by: INTERNAL MEDICINE

## 2017-05-14 PROCEDURE — 93931 UPPER EXTREMITY STUDY: CPT

## 2017-05-14 PROCEDURE — A9270 NON-COVERED ITEM OR SERVICE: HCPCS | Performed by: HOSPITALIST

## 2017-05-14 PROCEDURE — 82607 VITAMIN B-12: CPT

## 2017-05-14 PROCEDURE — 85025 COMPLETE CBC W/AUTO DIFF WBC: CPT

## 2017-05-14 PROCEDURE — 93931 UPPER EXTREMITY STUDY: CPT | Mod: 26 | Performed by: SURGERY

## 2017-05-14 PROCEDURE — 770006 HCHG ROOM/CARE - MED/SURG/GYN SEMI*

## 2017-05-14 PROCEDURE — 36415 COLL VENOUS BLD VENIPUNCTURE: CPT

## 2017-05-14 RX ORDER — VENLAFAXINE 75 MG/1
37.5 TABLET ORAL
Status: DISCONTINUED | OUTPATIENT
Start: 2017-05-14 | End: 2017-05-18 | Stop reason: HOSPADM

## 2017-05-14 RX ORDER — LOSARTAN POTASSIUM 50 MG/1
100 TABLET ORAL DAILY
Status: DISCONTINUED | OUTPATIENT
Start: 2017-05-14 | End: 2017-05-18 | Stop reason: HOSPADM

## 2017-05-14 RX ADMIN — ONDANSETRON 4 MG: 2 INJECTION INTRAMUSCULAR; INTRAVENOUS at 07:43

## 2017-05-14 RX ADMIN — AMLODIPINE BESYLATE 5 MG: 5 TABLET ORAL at 11:20

## 2017-05-14 RX ADMIN — STANDARDIZED SENNA CONCENTRATE AND DOCUSATE SODIUM 2 TABLET: 8.6; 5 TABLET, FILM COATED ORAL at 21:12

## 2017-05-14 RX ADMIN — CALCITRIOL 0.25 MCG: 0.25 CAPSULE, LIQUID FILLED ORAL at 09:00

## 2017-05-14 RX ADMIN — VENLAFAXINE 37.5 MG: 75 TABLET ORAL at 21:12

## 2017-05-14 RX ADMIN — INSULIN LISPRO 2 UNITS: 100 INJECTION, SOLUTION INTRAVENOUS; SUBCUTANEOUS at 21:12

## 2017-05-14 ASSESSMENT — ENCOUNTER SYMPTOMS
ABDOMINAL PAIN: 0
WEAKNESS: 1
EYES NEGATIVE: 1
BRUISES/BLEEDS EASILY: 0
LOSS OF CONSCIOUSNESS: 0
NERVOUS/ANXIOUS: 0
PSYCHIATRIC NEGATIVE: 1
GASTROINTESTINAL NEGATIVE: 1
CONSTIPATION: 0
MUSCULOSKELETAL NEGATIVE: 1
RESPIRATORY NEGATIVE: 1
HEADACHES: 0
DIARRHEA: 0
FOCAL WEAKNESS: 0
COUGH: 0
CARDIOVASCULAR NEGATIVE: 1
FEVER: 0
DIZZINESS: 0
SHORTNESS OF BREATH: 0
NAUSEA: 0
CHILLS: 0
WEAKNESS: 0

## 2017-05-14 ASSESSMENT — PAIN SCALES - GENERAL: PAINLEVEL_OUTOF10: 0

## 2017-05-14 NOTE — PROGRESS NOTES
[facility] Nephrology Progress Note               Author: Norma Maxwell M.D. Date & Time created: 5/14/2017  2:41 PM     Interval History:  This is an 83-year-old  female with a past medical history significant for end-stage renal disease, on chronic hemodialysis for the past 2 years, hypertension, diabetes mellitus type 2,   anemia secondary to chronic kidney disease, renal osteodystrophy, depression, who was admitted with complaints of neck pain and having missed outpatient hemodialysis for the past 2 weeks.  Patient is primarily Belizean-speaking.  The history was obtained with the help of her family who is at bedside who were able to translate.  The patient recently moved from [place] to [place] to live with her daughter.  She has been tentatively accepted at outpatient dialysis at [facility]; however, they were unable to accept her and confirmed her chair because of the lack of a tuberculosis test results that they require.  Patient had last dialyzed about 1 week ago when she was living in [place], although this time line is somewhat uncertain as she was living with her brother in [place] and it is unclear whether she was dialyzed 1 week or a few days ago.  At any rate, she was unable to be dialyzed as an outpatient today when she presented to the [place] [facility] because   of lack of the tuberculosis test and so she was brought to the emergency room for further evaluation since she has missed her dialysis for a week as well as because of her neck pain.  Labs in the emergency room revealed an elevated potassium of 5.5 and an elevated BUN of 113.  Patient reports that she is feeling okay.  She denies any chest pain or shortness of breath.  No lightheadedness.  She is not having any lower extremity edema.  According to her family, patient has stopped many of her medications.  She is only taking losartan for her blood pressure as well as venlafaxine for depression.   She has a history of diabetes mellitus type 2 that ultimately led to her end-stage renal disease, but according to her family, she is not taking any pills or insulin at this time.  She is also dialyzing via a right chest PermCath and according to the family, she has had a catheter in for about 2 years.  There    have been no attempts to create a vascular access such as an AV fistula or an AV graft according to her family at bedside.  They would like to proceed with vascular access creation if possible.    DAILY NEPHROLOGY SUMMARY  5/13/17--No events, dialyzed yesterday and feels better today, no neck pain today, BP elevated this am, BUN improved after HD yesterday, only tolerated 300cc fluid removal with HD yesterday due to cramping  5/14/17--No events, feels well, tolerated HD yesterday with 900cc UF, BP stable, platelets slight improved at 45K, WBC count slightly improved at 3.4    Review of Systems:  Review of Systems   Constitutional: Negative for fever and chills.   HENT: Negative.    Eyes: Negative.    Respiratory: Negative.    Cardiovascular: Negative.    Gastrointestinal: Negative.    Genitourinary: Negative.    Musculoskeletal: Negative.    Skin: Negative.    Neurological: Positive for weakness. Negative for dizziness, focal weakness and loss of consciousness.   Endo/Heme/Allergies: Negative.    Psychiatric/Behavioral: Negative.        Physical Exam:  Physical Exam   Constitutional: She is oriented to person, place, and time. She appears well-developed. No distress.   +Frail appearance   HENT:   Head: Normocephalic and atraumatic.   Mouth/Throat: No oropharyngeal exudate.   Eyes: Conjunctivae and EOM are normal. Pupils are equal, round, and reactive to light. No scleral icterus.   Neck: Normal range of motion. Neck supple. No thyromegaly present.   Cardiovascular: Normal rate and regular rhythm.    No murmur heard.  Pulmonary/Chest: Effort normal and breath sounds normal. No respiratory distress.   +R Chest  PC   Abdominal: Soft. Bowel sounds are normal. She exhibits no distension.   Musculoskeletal: Normal range of motion. She exhibits no edema or tenderness.   Neurological: She is alert and oriented to person, place, and time. She exhibits normal muscle tone.   Skin: Skin is warm and dry. No erythema.   Psychiatric: Her speech is normal. Her affect is blunt.   Nursing note and vitals reviewed.      Labs:        Invalid input(s): [id number]      Recent Labs      17   1409  17   1704  17   0241   SODIUM  137   --   137  135   POTASSIUM  5.5   --   4.0  3.8   CHLORIDE  106   --   103  101   CO2  19*   --   24  25   BUN  113*   --   51*  37*   CREATININE  5.84*   --   3.34*  3.24*   PHOSPHORUS   --   5.4*   --    --    CALCIUM  9.5   --   9.0  9.4     Recent Labs      17   14017   0241   ALTSGPT  17   --    --    ASTSGOT  18   --    --    ALKPHOSPHAT  93   --    --    TBILIRUBIN  0.4   --    --    GLUCOSE  162*  124*  113*     Recent Labs      17   14017   0241   RBC  3.75*  3.76*  3.92*   HEMOGLOBIN  11.6*  11.5*  12.2   HEMATOCRIT  36.2*  34.7*  36.7*   PLATELETCT  34*  33*  45*     Recent Labs      17   0241   WBC  2.4*  2.3*  3.3*   NEUTSPOLYS  67.80  64.40  60.80   LYMPHOCYTES  19.10*  18.70*  18.70*   MONOCYTES  7.80  8.70  10.10   EOSINOPHILS  4.40  7.80*  9.50*   BASOPHILS  0.90  0.40  0.60   ASTSGOT  18   --    --    ALTSGPT  17   --    --    ALKPHOSPHAT  93   --    --    TBILIRUBIN  0.4   --    --            Hemodynamics:  Temp (24hrs), Av °C (98.6 °F), Min:36.9 °C (98.5 °F), Max:37 °C (98.6 °F)  Temperature: 36.9 °C (98.5 °F)  Pulse  Av  Min: 58  Max: 64   Blood Pressure : 141/79 mmHg     Respiratory:    Respiration: 18, Pulse Oximetry: 97 %        RUL Breath Sounds: Clear, RML Breath Sounds: Diminished, RLL Breath Sounds: Diminished, KATHARINE Breath Sounds: Clear,  LLL Breath Sounds: Diminished  Fluids:    Intake/Output Summary (Last 24 hours) at 05/14/17 1441  Last data filed at 05/13/17 2100   Gross per 24 hour   Intake    500 ml   Output   1400 ml   Net   -900 ml     Weight: 50.6 kg (111 lb 8.8 oz)  GI/Nutrition:  Orders Placed This Encounter   Procedures   • Diet Order     Standing Status: Standing      Number of Occurrences: 1      Standing Expiration Date:      Order Specific Question:  Diet:     Answer:  Renal [8]     Medical Decision Making, by Problem:  Active Hospital Problems    Diagnosis   • HTN (hypertension) [I10]   • ESRD on hemodialysis (CMS-[facility]) [N18.6, Z99.2]   • Pancytopenia (CMS-[facility]) [D61.818]     ASSESSMENT/PLAN:  1. ESRD--HD TTS as outpatient in [place] and now relocated to [place] but has not yet been accepted at outpatient dialysis in [facility] due to lack of TB test. Pt's last dialysis was 1 week ago. She has R Chest PC and no AVF/AVG, family is interest in having vascular access created at some point  --No HD today, will plan for HD tomorrow and qMWF  --Quantiferon Gold test pending, await final results to send to outpatient dialysis unit at [facility] for acceptance  --Pt has been tentatively accepted at [facility] unit pending TB test results  --Dose adjust all meds for decreased GFR  --Ordered vein mapping for eventual AVF or AVG creation (surgery can be done as outpatient)  2. Hyperkalemia--resolved with HD  3. HTN--improved  --Continue current BP meds  --Monitor  4. 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 Leukopenia--white count low, unclear etiology, slightly improved today  --Hematology consulted and imaging studies and labs ordered  --Cx if spikes a fever  6. Thrombocytopenia--unclear etiology, not using heparin with HD this admission, last HD was 1 week ago and unclear if she had been  receiving heparin at her prior dialysis unit in [place]  --No heparin with HD  --Lock Dialysis catheter with ACD Citrate  --Hematology Consulted and following  --Transfuse platelets as needed for any signs of active bleeding  7. Anemia--secondary to CKD, goal hgb 10-11  --No need for IDALIA at this time  --Monitor  8. Renal Osteodystrophy--phos level stable  --Low phos diet  9. Secondary Hyperparathyroidism--iPTH elevated  --Started calcitriol  10. Neck Pain--resolved, pain management per primary svc  11. Mild Protein-Calorie Malnutrition--no dietary protein restrictions    **Discussed with grand-daughter at bedside    Medications reviewed, Labs reviewed and Radiology images reviewed

## 2017-05-14 NOTE — PROGRESS NOTES
Maddie from Lab called with critical result of platelet at 45 at 0415. Critical lab result read back to Maddie.   OREN Stewart notified of critical lab result at 0425.  Critical lab result read back by OREN Stewart.

## 2017-05-14 NOTE — PROGRESS NOTES
Assumed care of pt at shift change, discussed POC. Pt A&Ox4. Denies pain, numbness or tingling. IV in place, perm cath in place. Pt pleasant and cooperative, family on BS, pt speaks Uzbek mainly, takes medication well with water. Bed alarm in place, 1 assist, treaded socks on, call light within reach, bed in low position.

## 2017-05-14 NOTE — CARE PLAN
Problem: Communication  Goal: The ability to communicate needs accurately and effectively will improve  Outcome: PROGRESSING AS EXPECTED  Intervention: Use communication aids and/or /Language Line as appropriate  Using communication aids and /language line to communicate with patient in care      Problem: Safety  Goal: Will remain free from injury  Outcome: PROGRESSING AS EXPECTED  FSBS check per MAR, meds given per MAR

## 2017-05-14 NOTE — PROGRESS NOTES
2 hr HD and 900 ml net UF was tolerated. tx ended at 1800. Right IJ is intact, dressing is clean and dry.

## 2017-05-14 NOTE — CONSULTS
DATE OF SERVICE:  05/13/2017    ONCOLOGY CONSULTATION NOTE    DATE OF SERVICE:  05/13/2017    REQUESTING PHYSICIAN:  Quinten Friend MD    REASON FOR CONSULTATION:  Pancytopenia.    HISTORY OF PRESENT ILLNESS:  The patient is an 83-year-old woman with a   history of end-stage renal disease, on dialysis, who came to the emergency   room yesterday because she missed her dialysis, she has just been brought up   to [place] from [place] to live with her family.  On arrival to the emergency   room, she had no specific complaints, but was noted to have pancytopenia.    Blood counts today show white blood cell count 2.3, hemoglobin 11.5, platelets   33, all quite stable from yesterday.  MCV 92, ANC 1.5, ALC 0.43.  She tells   me that she has problems with her blood counts in the past.  She in fact   denies having all problems, including kidney problems, hypertension, and   diabetes.  So, her past history is somewhat questionable.  The interview was   obtained in Hebrew with family members at the bedside available for any   clarification as needed.    She reports that she generally has been feeling well.  She denies fevers or   night sweats.  He states that her energy level has been good and she is quite   active.  Appetite is good and she has not lost weight.  No headaches, bone   pain, chest pain, shortness of breath, cough, abdominal pain, nausea,   diarrhea, or constipation.  No skin rash.  She just wants to go home at this   point.    REVIEW OF SYSTEMS:  A 10-point review of systems was completed and negative   except as noted above.    PAST MEDICAL HISTORY:  As per the chart:  1.  End-stage renal disease, on dialysis.  2.  Hypertension.  3.  Diabetes.    Patient denies all prior medical problems.    PAST SURGICAL HISTORY:  None.    MEDICATIONS:  Current medications include amlodipine 5 mg daily, insulin   sliding scale and bowel regimen with additional medications as needed.    ALLERGIES:  No known drug  allergies.    FAMILY HISTORY:  No family history of cancer or blood disorders that she is   aware of.    SOCIAL HISTORY:  She is originally from [place] and is relocating from [place] to [place] to live with family.  She previously worked as a homemaker.    She had 4 children, but 1 has .  No history of tobacco, alcohol or drug   use.    PHYSICAL EXAMINATION:  VITAL SIGNS:  Temperature 36.8, blood pressure 175/64, heart rate 58,   respiratory rate 16, oxygen saturation 98% on room air.  ECOG performance   status is 0, by her report.  GENERAL:  The patient is a thin woman, sitting up in bed, in no acute   distress.  She answers questions appropriately, although the validity of her   answers is somewhat in question, as outlined above.  HEENT:  Sclerae are anicteric.  Oropharynx is clear.  Mucous membranes are   moist.  NECK:  Supple without lymphadenopathy or thyromegaly.  HEART:  Regular rate and rhythm with a II/VI murmur appreciated throughout the   precordium, best at the right upper sternal border.  LUNGS:  Clear to auscultation bilaterally.  ABDOMEN:  Bowel sounds are present, soft, nontender, nondistended without   palpable masses or organomegaly.  EXTREMITIES:  Warm.  No lower extremity edema.  SKIN:  No petechiae, ecchymosis or rash.  She does have chronic venous stasis   changes over the lower extremities.  NEUROLOGIC:  Alert and oriented x3.  PSYCHIATRIC:  Appropriate mood and affect, somewhat reserved.    LABORATORY DATA:  CBC and differential are as noted above.  Creatinine is 3.3,   glucose 124.  Liver chemistry is within normal limits.  Hemoglobin A1c is   6.3.  Vitamin D 21, , hepatitis serology is negative.    DIAGNOSTIC IMAGING:  None.    ASSESSMENT AND RECOMMENDATIONS:  In summary, the patient is an 83-year-old   woman with a history of diabetes, hypertension and end-stage renal disease, on   dialysis, who presents with no complaints other than to initiate dialysis and   is found to  have pancytopenia.  No records from [place] are available,   although I requested that the primary team to work on finding them.  That   would be of great use in her situation.  She feels well without recent   symptoms.  I will check B12, folic acid, TSH, and HIV.  I will order an   abdominal ultrasound to rule out hepatosplenomegaly.  If these examinations do   not reveal a cause for her pancytopenia, she likely will need a bone marrow   biopsy to rule out myelodysplastic syndrome.  I will review her blood smear.    She does have a mild neutropenia, but no evidence of current or recent   infections.  This can be monitored and does not require any specific therapy   at the present time.  The possibility of multiple myeloma is considered given   pancytopenia, normocytic as well as end-stage renal disease; however, she has   no evidence of elevated protein or globulin gap, so this is less likely.    Thank you very much for the consultation.  I will continue to follow with you   while she is hospitalized and she can followup with us as an outpatient as   well, if the workup is not yet completed.       ____________________________________     MD LENNY Hernandez / NTS    DD:  05/13/2017 15:08:05  DT:  05/13/2017 18:34:20    D#:  [id number]  Job#:  [id number]

## 2017-05-14 NOTE — PROGRESS NOTES
Oncology/Hematology Progress Note               Author: Nathalie Ureña Meter Date & Time created: 5/14/2017  3:21 PM     CC: thrombocytopenia, leukopenia    Interval History:  Feeling OK.  Denies headache, bleeding, chest pain, shortness of breath, cough, abdominal pain, diarrhea or constipation.      Review of Systems:  Review of Systems   Constitutional: Negative for fever and malaise/fatigue.   Respiratory: Negative for cough and shortness of breath.    Cardiovascular: Negative for leg swelling.   Gastrointestinal: Negative for nausea, abdominal pain, diarrhea and constipation.   Genitourinary: Negative for dysuria.   Skin: Negative for rash.   Neurological: Negative for focal weakness, weakness and headaches.   Endo/Heme/Allergies: Does not bruise/bleed easily.   Psychiatric/Behavioral: The patient is not nervous/anxious.        Physical Exam:  Physical Exam   Constitutional: She appears well-developed. No distress.   thin   HENT:   Mucous membranes moist   Eyes: No scleral icterus.   Pulmonary/Chest: Effort normal.   Neurological: She is alert.   Skin: Skin is warm.   Psychiatric: She has a normal mood and affect. Her behavior is normal.   Vitals reviewed.      Labs:        Invalid input(s): [id number]      Recent Labs      05/12/17   1409  05/12/17   1704  05/13/17 0222 05/14/17   0241   SODIUM  137   --   137  135   POTASSIUM  5.5   --   4.0  3.8   CHLORIDE  106   --   103  101   CO2  19*   --   24  25   BUN  113*   --   51*  37*   CREATININE  5.84*   --   3.34*  3.24*   PHOSPHORUS   --   5.4*   --    --    CALCIUM  9.5   --   9.0  9.4     Recent Labs      05/12/17   1409  05/13/17 0222  05/14/17   0241   ALTSGPT  17   --    --    ASTSGOT  18   --    --    ALKPHOSPHAT  93   --    --    TBILIRUBIN  0.4   --    --    GLUCOSE  162*  124*  113*     Recent Labs      05/12/17   1409  05/13/17 0222  05/14/17   0241   RBC  3.75*  3.76*  3.92*   HEMOGLOBIN  11.6*  11.5*  12.2   HEMATOCRIT  36.2*  34.7*   36.7*   PLATELETCT  34*  33*  45*     Recent Labs      17   1409  172  17   0241   WBC  2.4*  2.3*  3.3*   NEUTSPOLYS  67.80  64.40  60.80   LYMPHOCYTES  19.10*  18.70*  18.70*   MONOCYTES  7.80  8.70  10.10   EOSINOPHILS  4.40  7.80*  9.50*   BASOPHILS  0.90  0.40  0.60   ASTSGOT  18   --    --    ALTSGPT  17   --    --    ALKPHOSPHAT  93   --    --    TBILIRUBIN  0.4   --    --      Recent Labs      17   1409  172  17   0241   SODIUM  137  137  135   POTASSIUM  5.5  4.0  3.8   CHLORIDE  106  103  101   CO2  19*  24  25   GLUCOSE  162*  124*  113*   BUN  113*  51*  37*   CREATININE  5.84*  3.34*  3.24*   CALCIUM  9.5  9.0  9.4     Hemodynamics:  Temp (24hrs), Av °C (98.6 °F), Min:36.9 °C (98.5 °F), Max:37 °C (98.6 °F)  Temperature: 36.9 °C (98.5 °F)  Pulse  Av  Min: 58  Max: 64   Blood Pressure : 141/79 mmHg     Respiratory:    Respiration: 18, Pulse Oximetry: 97 %        RUL Breath Sounds: Clear, RML Breath Sounds: Diminished, RLL Breath Sounds: Diminished, KATHARINE Breath Sounds: Clear, LLL Breath Sounds: Diminished  Fluids:    Intake/Output Summary (Last 24 hours) at 17 1521  Last data filed at 17 2100   Gross per 24 hour   Intake    500 ml   Output   1400 ml   Net   -900 ml     Weight: 50.6 kg (111 lb 8.8 oz)  GI/Nutrition:  Orders Placed This Encounter   Procedures   • Diet Order     Standing Status: Standing      Number of Occurrences: 1      Standing Expiration Date:      Order Specific Question:  Diet:     Answer:  Renal [8]     Medical Decision Making, by Problem:  Active Hospital Problems    Diagnosis   • HTN (hypertension) [I10]   • ESRD on hemodialysis (CMS-[facility]) [N18.6, Z99.2]   • Pancytopenia (CMS-[facility]) [D61.818]       Plan:  1.  Severe thrombocytopenia, moderate leukopenia, very mild anemia.  Normocytic.  No B symptoms.  Normal B12, folic acid, TSH, and HIV.  Await abdominal ultrasound.  If ultrasound doesn't show a clear cause for  cytopenias (eg cirrhosis with splenomegaly) I do think she needs a bone marrow biopsy to rule out MDS and other primary marrow disorders or infiltration. Explained reasoning for this and patient and her granddaughter Sadie ([phone]) understand and are agreeable to moving forward with bone marrow biopsy.  I will go ahead and order it to be done tomorrow, to cancel if abdominal ultrasound shows the above.  If she is discharged prior to return of results, I will see her in my office in ~2 weeks when all testing is back.  If she is still here, please notify my partner on call and they will return to see her.    2.  ESRD on HD  3.  HTN, poorly controlled  4.  Diabetes mellitus.     Core Measures

## 2017-05-14 NOTE — PROGRESS NOTES
Hospital Medicine Progress Note, Adult, Complex               Author: Quinten KAREN Friend Date & Time created: 5/14/2017  9:19 AM   CC: needs dialysis    Interval History:  Nephrology notes were reviewed and appreciated. The pt was seen and examined. She is a little nauseous this am but denies vomiting. She is not in pain. Nephrology recommended to continue home dose of Losartan in addition to amlodipine. Echo and sono of the kidneys has not been done yet. Labs were reviewed. Hematology  recommended to check B12, folic acid, TSH, HIV and abdominal ultrasound.    Review of Systems:  ROS   Pertinent positives/negative as mentioned above.     A complete review of systems was done. All other systems were negative.     Physical Exam:  Physical Exam   Constitutional: Well-developed, well-nourished, (-) diaphoresis, (-) distress  HENT: Normocephalic, atraumatic, (-) R upper chest Perma cath  Eyes: PERRLA, pink conjuctivae, (-) icteric sclerae  Neck: (-) cervical lymphadenopathy, (-) rigidity  Cardiovascular: RRR, (+) murmurs, (-) rubs, (-) gallops, (-) edema  Pulmonary: Equal chest expansion, (+) clear breath sounds, (-) wheezes/rhonchi, (-) crackles/rales  Abdominal: (+) bowel sounds, soft, (-) tenderness, (-) masses, (-) guarding/rebound  Musculoskeletal: (-) joint swelling, (-) joint tenderness, (-) joint deformities, (-) muscle tenderness, (-) gross limitation of movement of 4 extremities  Neurologic: Non-focal, moves all 4 extremities, sensory grossly intact  Skin: (-) erythema, (-) warmth, (-) rashes, (-) ulcers, (-) open wounds  Psychiatric: mood/affect WNL, thought content WNL, behavior age appropriate    Labs:        Invalid input(s): [id number]      Recent Labs      05/12/17   1409  05/12/17   1704  05/13/17   0222  05/14/17   0241   SODIUM  137   --   137  135   POTASSIUM  5.5   --   4.0  3.8   CHLORIDE  106   --   103  101   CO2  19*   --   24  25   BUN  113*   --   51*  37*   CREATININE  5.84*   --   3.34*   3.24*   PHOSPHORUS   --   5.4*   --    --    CALCIUM  9.5   --   9.0  9.4     Recent Labs      17   0241   ALTSGPT  17   --    --    ASTSGOT  18   --    --    ALKPHOSPHAT  93   --    --    TBILIRUBIN  0.4   --    --    GLUCOSE  162*  124*  113*     Recent Labs      17   024   RBC  3.75*  3.76*  3.92*   HEMOGLOBIN  11.6*  11.5*  12.2   HEMATOCRIT  36.2*  34.7*  36.7*   PLATELETCT  34*  33*  45*     Recent Labs      17   0241   WBC  2.4*  2.3*  3.3*   NEUTSPOLYS  67.80  64.40  60.80   LYMPHOCYTES  19.10*  18.70*  18.70*   MONOCYTES  7.80  8.70  10.10   EOSINOPHILS  4.40  7.80*  9.50*   BASOPHILS  0.90  0.40  0.60   ASTSGOT  18   --    --    ALTSGPT  17   --    --    ALKPHOSPHAT  93   --    --    TBILIRUBIN  0.4   --    --            Hemodynamics:  Temp (24hrs), Av °C (98.6 °F), Min:36.9 °C (98.5 °F), Max:37 °C (98.6 °F)  Temperature: 36.9 °C (98.5 °F)  Pulse  Av  Min: 58  Max: 64   Blood Pressure : 141/79 mmHg     Respiratory:    Respiration: 18, Pulse Oximetry: 97 %      Fluids:    Intake/Output Summary (Last 24 hours) at 17 09  Last data filed at 17 2100   Gross per 24 hour   Intake    500 ml   Output   1400 ml   Net   -900 ml     Weight: 50.6 kg (111 lb 8.8 oz)  GI/Nutrition:  Orders Placed This Encounter   Procedures   • Diet Order     Standing Status: Standing      Number of Occurrences: 1      Standing Expiration Date:      Order Specific Question:  Diet:     Answer:  Renal [8]     Medical Decision Making, by Problem:  Active Hospital Problems    Diagnosis   • HTN (hypertension) [I10]   • ESRD on hemodialysis (CMS-HCC) [N18.6, Z99.2]  Hyperkalemia  Start norvasc 5 daily  Losartan restarted  Hemodialysis  Dr. Maxwell notes appreciated.     • Pancytopenia (CMS-HCC) [D61.818]  abdl jaileneo ffup  Dr. Mckeon notes appreciated    Heart murmur  Echocardiogram ffup    DM  2  Diet controlled       Core Measures    Full code  SCD

## 2017-05-15 ENCOUNTER — APPOINTMENT (OUTPATIENT)
Dept: RADIOLOGY | Facility: MEDICAL CENTER | Age: 82
DRG: 673 | End: 2017-05-15
Attending: INTERNAL MEDICINE
Payer: MEDICARE

## 2017-05-15 LAB
GLUCOSE BLD-MCNC: 116 MG/DL (ref 65–99)
GLUCOSE BLD-MCNC: 153 MG/DL (ref 65–99)
GLUCOSE BLD-MCNC: 233 MG/DL (ref 65–99)
GLUCOSE BLD-MCNC: 88 MG/DL (ref 65–99)
M TB TUBERC IFN-G BLD QL: NEGATIVE
M TB TUBERC IFN-G/MITOGEN IGNF BLD: 0.17
M TB TUBERC IGNF/MITOGEN IGNF CONTROL: 55.46 [IU]/ML
MITOGEN IGNF BCKGRD COR BLD-ACNC: 0.06 [IU]/ML

## 2017-05-15 PROCEDURE — 160002 HCHG RECOVERY MINUTES (STAT): Performed by: HOSPITALIST

## 2017-05-15 PROCEDURE — 99153 MOD SED SAME PHYS/QHP EA: CPT | Performed by: HOSPITALIST

## 2017-05-15 PROCEDURE — 770006 HCHG ROOM/CARE - MED/SURG/GYN SEMI*

## 2017-05-15 PROCEDURE — 700111 HCHG RX REV CODE 636 W/ 250 OVERRIDE (IP): Performed by: HOSPITALIST

## 2017-05-15 PROCEDURE — 700111 HCHG RX REV CODE 636 W/ 250 OVERRIDE (IP)

## 2017-05-15 PROCEDURE — 90935 HEMODIALYSIS ONE EVALUATION: CPT

## 2017-05-15 PROCEDURE — 88311 DECALCIFY TISSUE: CPT

## 2017-05-15 PROCEDURE — A9270 NON-COVERED ITEM OR SERVICE: HCPCS | Performed by: HOSPITALIST

## 2017-05-15 PROCEDURE — 99231 SBSQ HOSP IP/OBS SF/LOW 25: CPT | Performed by: INTERNAL MEDICINE

## 2017-05-15 PROCEDURE — 160035 HCHG PACU - 1ST 60 MINS PHASE I: Performed by: HOSPITALIST

## 2017-05-15 PROCEDURE — 38221 DX BONE MARROW BIOPSIES: CPT | Performed by: HOSPITALIST

## 2017-05-15 PROCEDURE — 160048 HCHG OR STATISTICAL LEVEL 1-5: Performed by: HOSPITALIST

## 2017-05-15 PROCEDURE — 82962 GLUCOSE BLOOD TEST: CPT | Mod: 91

## 2017-05-15 PROCEDURE — 99152 MOD SED SAME PHYS/QHP 5/>YRS: CPT | Performed by: HOSPITALIST

## 2017-05-15 PROCEDURE — 700101 HCHG RX REV CODE 250: Performed by: INTERNAL MEDICINE

## 2017-05-15 PROCEDURE — 0Q923ZX DRAINAGE OF RIGHT PELVIC BONE, PERCUTANEOUS APPROACH, DIAGNOSTIC: ICD-10-PCS | Performed by: HOSPITALIST

## 2017-05-15 PROCEDURE — 88185 FLOWCYTOMETRY/TC ADD-ON: CPT | Mod: 91

## 2017-05-15 PROCEDURE — 76700 US EXAM ABDOM COMPLETE: CPT

## 2017-05-15 PROCEDURE — 88264 CHROMOSOME ANALYSIS 20-25: CPT

## 2017-05-15 PROCEDURE — 160027 HCHG SURGERY MINUTES - 1ST 30 MINS LEVEL 2: Performed by: HOSPITALIST

## 2017-05-15 PROCEDURE — 700102 HCHG RX REV CODE 250 W/ 637 OVERRIDE(OP): Performed by: INTERNAL MEDICINE

## 2017-05-15 PROCEDURE — A9270 NON-COVERED ITEM OR SERVICE: HCPCS | Performed by: INTERNAL MEDICINE

## 2017-05-15 PROCEDURE — 88291 CYTO/MOLECULAR REPORT: CPT

## 2017-05-15 PROCEDURE — 88305 TISSUE EXAM BY PATHOLOGIST: CPT | Mod: 59

## 2017-05-15 PROCEDURE — 88184 FLOWCYTOMETRY/ TC 1 MARKER: CPT

## 2017-05-15 PROCEDURE — 88313 SPECIAL STAINS GROUP 2: CPT

## 2017-05-15 PROCEDURE — 700102 HCHG RX REV CODE 250 W/ 637 OVERRIDE(OP): Performed by: HOSPITALIST

## 2017-05-15 PROCEDURE — 88237 TISSUE CULTURE BONE MARROW: CPT

## 2017-05-15 RX ORDER — MIDAZOLAM HYDROCHLORIDE 1 MG/ML
INJECTION INTRAMUSCULAR; INTRAVENOUS
Status: DISCONTINUED | OUTPATIENT
Start: 2017-05-15 | End: 2017-05-15 | Stop reason: HOSPADM

## 2017-05-15 RX ORDER — MIDAZOLAM HYDROCHLORIDE 1 MG/ML
.5-2 INJECTION INTRAMUSCULAR; INTRAVENOUS PRN
Status: ACTIVE | OUTPATIENT
Start: 2017-05-15 | End: 2017-05-15

## 2017-05-15 RX ORDER — HEPARIN SODIUM 1000 [USP'U]/ML
INJECTION, SOLUTION INTRAVENOUS; SUBCUTANEOUS
Status: COMPLETED
Start: 2017-05-15 | End: 2017-05-15

## 2017-05-15 RX ORDER — SODIUM CHLORIDE 9 MG/ML
500 INJECTION, SOLUTION INTRAVENOUS
Status: ACTIVE | OUTPATIENT
Start: 2017-05-15 | End: 2017-05-15

## 2017-05-15 RX ORDER — HEPARIN SODIUM 1000 [USP'U]/ML
3300 INJECTION, SOLUTION INTRAVENOUS; SUBCUTANEOUS
Status: DISCONTINUED | OUTPATIENT
Start: 2017-05-15 | End: 2017-05-18 | Stop reason: HOSPADM

## 2017-05-15 RX ADMIN — AMLODIPINE BESYLATE 5 MG: 5 TABLET ORAL at 16:29

## 2017-05-15 RX ADMIN — VENLAFAXINE 37.5 MG: 75 TABLET ORAL at 20:14

## 2017-05-15 RX ADMIN — ACETAMINOPHEN 650 MG: 325 TABLET, FILM COATED ORAL at 16:29

## 2017-05-15 RX ADMIN — LOSARTAN POTASSIUM 100 MG: 50 TABLET, FILM COATED ORAL at 16:29

## 2017-05-15 RX ADMIN — DEXTROSE MONOHYDRATE, SODIUM CITRATE, AND CITRIC ACID MONOHYDRATE: 2.45; 2.2; .8 INJECTION, SOLUTION INTRAVENOUS at 10:40

## 2017-05-15 RX ADMIN — LORAZEPAM 0.5 MG: 2 INJECTION INTRAMUSCULAR; INTRAVENOUS at 16:29

## 2017-05-15 RX ADMIN — CALCITRIOL 0.25 MCG: 0.25 CAPSULE, LIQUID FILLED ORAL at 08:27

## 2017-05-15 RX ADMIN — STANDARDIZED SENNA CONCENTRATE AND DOCUSATE SODIUM 2 TABLET: 8.6; 5 TABLET, FILM COATED ORAL at 08:27

## 2017-05-15 RX ADMIN — STANDARDIZED SENNA CONCENTRATE AND DOCUSATE SODIUM 2 TABLET: 8.6; 5 TABLET, FILM COATED ORAL at 20:14

## 2017-05-15 RX ADMIN — LORAZEPAM 0.5 MG: 2 INJECTION INTRAMUSCULAR; INTRAVENOUS at 08:27

## 2017-05-15 RX ADMIN — INSULIN LISPRO 1 UNITS: 100 INJECTION, SOLUTION INTRAVENOUS; SUBCUTANEOUS at 17:56

## 2017-05-15 RX ADMIN — INSULIN LISPRO 2 UNITS: 100 INJECTION, SOLUTION INTRAVENOUS; SUBCUTANEOUS at 20:14

## 2017-05-15 ASSESSMENT — ENCOUNTER SYMPTOMS
EYES NEGATIVE: 1
WEAKNESS: 1
PSYCHIATRIC NEGATIVE: 1
GASTROINTESTINAL NEGATIVE: 1
RESPIRATORY NEGATIVE: 1
CARDIOVASCULAR NEGATIVE: 1
MUSCULOSKELETAL NEGATIVE: 1

## 2017-05-15 ASSESSMENT — PAIN SCALES - GENERAL
PAINLEVEL_OUTOF10: 0

## 2017-05-15 NOTE — CARE PLAN
Problem: Knowledge Deficit  Goal: Knowledge of disease process/condition, treatment plan, diagnostic tests, and medications will improve  POC: Dialysis, bone marrow aspirate at 1100    Problem: Psychosocial Needs:  Goal: Level of anxiety will decrease  PRN ativan given to pt in dialysis for anxiety.

## 2017-05-15 NOTE — PROGRESS NOTES
Received report, assumed pt care. Pt a&o 4, VSS, Assessment completed. Resting comfortably in bed with call light, bedside table in reach. No c/o at this time. Side rails up 2. Instructed to use call light when needing to get OOB verbalized understanding. Bed alarm on, bed in low position. Will continue to monitor.

## 2017-05-15 NOTE — DISCHARGE PLANNING
Notified per nephrology that pt will require OP hemodialysis placement.  Quant gold lab has been ordered and pt cannot DC until this lab has resulted negative.  Pt has tentative chair time of MWF 1515 at [facility] pending quant gold.  Will follow.

## 2017-05-15 NOTE — PROGRESS NOTES
Assumed care, pt resting in bed, daughter at bedside. Pt awake alert and resp. No distress, no complaints, call light in reach, bed in lowest and locked position.

## 2017-05-15 NOTE — PROGRESS NOTES
[facility] -  PROGRESS NOTE               Author: Zari Lucas M.D. 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  Patient is primarily Malian-speaking.  The history was obtained with the help of her family who is at bedside who were able to translate.  The patient recently moved from [place] to [place] to live with her daughter.  She has been tentatively accepted at outpatient dialysis at [facility]; however, they were unable to accept her and confirmed her chair because of the lack of a tuberculosis test results that they require.  Patient had last dialyzed about 1 week ago when she was living in [place], although this time line is somewhat uncertain as she was living with her brother in [place] and it is unclear whether she was dialyzed 1 week or a few days ago.  At any rate, she was unable to be dialyzed as an outpatient today when she presented to the [place] [facility] because of lack of the tuberculosis test and so she was brought to the emergency room for further evaluation since she has missed her dialysis for a week as well as because of her neck pain.  Labs in the emergency room revealed an elevated potassium of 5.5 and an elevated BUN of 113.  Patient reports that she is feeling okay.  She denies any chest pain or shortness of breath.  No lightheadedness.  She is not having any lower extremity edema.  According to her family, patient has stopped many of her medications.  She is only taking losartan for her blood pressure as well as venlafaxine for depression.  She has a  history of diabetes mellitus type 2 that ultimately led to her end-stage renal disease, but according to her family, she is not taking any pills or insulin at this time.  She is also dialyzing via a right chest PermCath and according to the family, she has had a catheter in for about 2 years.  There have been no attempts to create a vascular access such as an AV fistula or an AV graft according to her family at bedside.  They would like to proceed with vascular access creation if possible.      DAILY NEPHROLOGY SUMMARY:  5/13/17 - No events, dialyzed yesterday and feels better today, no neck pain today, BP elevated this am, BUN improved after HD yesterday, only tolerated 300cc fluid removal with HD yesterday due to cramping  5/14/17 - No events, feels well, tolerated HD yesterday with 900cc UF, BP stable, platelets slight improved at 45K, WBC count slightly improved at 3.4  5/15/17 - NAEO, SOD, Qb300, 2L UF goal, complains of some cramping, UF on hold and had to terminate 30 min early      Review of Systems   Constitutional: Positive for malaise/fatigue.   HENT: Negative.    Eyes: Negative.    Respiratory: Negative.    Cardiovascular: Negative.    Gastrointestinal: Negative.    Musculoskeletal: Negative.    Skin: Negative.    Neurological: Positive for weakness.   Endo/Heme/Allergies: Negative.    Psychiatric/Behavioral: Negative.    All other systems reviewed and are negative.        Physical Exam   Constitutional: She is oriented to person, place, and time. She appears cachectic. She is active. She appears ill.   HENT:   Head: Normocephalic and atraumatic.   Eyes: Conjunctivae are normal. No scleral icterus.   Neck: Neck supple. No tracheal deviation present.   Cardiovascular: Normal rate and regular rhythm.    Murmur heard.  Pulmonary/Chest: Effort normal and breath sounds normal.   Abdominal: Soft. Bowel sounds are normal.   Musculoskeletal: She exhibits no edema or tenderness.   Neurological: She is alert and  oriented to person, place, and time. She exhibits normal muscle tone.   Skin: Skin is warm and dry.   Psychiatric: She has a normal mood and affect. Her behavior is normal.   Nursing note and vitals reviewed.        PAST FAMILY HISTORY: Reviewed and Unchanged  SOCIAL HISTORY: Reviewed and Unchanged  CURRENT MEDICATIONS: Reviewed  LABORATORY RESULTS: Reviewed  IMAGING STUDIES: Reviewed      Fluids:    Date 05/15/17 0700 - 05/16/17 0659   Shift 8189-5382 5469-7151 2889-1391 24 Hour Total   I  N  T  A  K  E   Dialysis 800   800      Dialysis Volume (Dialysis Intake) 800   800    Shift Total 800   800   O  U  T  P  U  T   Dialysis 1759   1759      Dialysis Output (Dialysis Output) 1759   1759    Shift Total 1759   1759   NET -959   -972         IMPRESSION:  - ESRD    * Etiology likely 2/2 HTN/DM  - Pancytopenia    * Etiology unclear, concern for MDS  - Hyperkalemia  - HTN  - type 2 DM  - Anemia of CKD  - Renal Osteodystrophy  - Mild-moderate PCM    ASSESSMENT:  - GFR: HD dependent  - Urine: oligoanuric  - BP: Goal < 150/90  - Volume: euvolemic  - Acid/Base: no sig. acid base disturbance  - Electrolytes: stable  - Anemia: Goal Hgb 10-11  - MBD: Ca normal, PO4 elevated, PTH elevated, Vit D low  - HD Access: RIJ PermCat    PLAN:  - MWF iHD during stay  - UF as tolerated  - No heparin, ACD in catheter port for locking  - Quantiferon gold test pending  - Due to her fraility pursuing permanent access with AVF/AVG needs to be re-evaluated with the family  - Bone marrow bx today  - [facility] outpatient HD when stable for D/C  - Dose all meds per ESRD guidelines

## 2017-05-15 NOTE — PROGRESS NOTES
3hr HD started @ 0732 and ended early @ 1006 due to leg cramps,net UF = 959ml,VSS,no further complaints once blood was returned,VSS,report given to Loida Medina RN,Dr Lucas notified.  .

## 2017-05-15 NOTE — PROGRESS NOTES
Hospital Medicine Progress Note, Adult, Complex               Author: Quinten KAREN Friend Date & Time created: 5/15/2017  1:24 PM   CC: needs dialysis    Interval History:  The consultant's inputs were reviewed and appreciated. She remains pancytopenic but there is mild improvement in her numbers. Bone marrow biopsy was done.     Sono of abd :  1.  Heterogeneous nodular appearing liver consistent with cirrhosis.  2.  Mild splenomegaly and splenic varices are noted consistent with portal hypertension.  3.  Increased renal echogenicity consistent with medical renal disease.  4.  Several cysts are noted in each kidney.    Echo : Normal LV function. There is grade 2 diastolic dysfunction. There is moderate aortic stenosis.      Review of Systems:  ROS   Unable to obtain. Pt is sedated.     Physical Exam:  Physical Exam   Constitutional: Well-developed, well-nourished, sedated  HENT: Normocephalic, atraumatic, (-) R upper chest Perma cath  Eyes: PERRLA, pink conjuctivae, (-) icteric sclerae  Neck: (-) cervical lymphadenopathy, (-) rigidity  Cardiovascular: RRR, (+) murmurs, (-) rubs, (-) gallops, (-) edema  Pulmonary: Equal chest expansion, (+) clear breath sounds, (-) wheezes/rhonchi, (-) crackles/rales  Abdominal: (+) bowel sounds, soft, (-) tenderness, (-) masses, (-) guarding/rebound  Musculoskeletal: (-) joint swelling, (-) joint tenderness, (-) joint deformities, (-) muscle tenderness, (-) gross limitation of movement of 4 extremities  Neurologic: Non-focal, moves all 4 extremities, sensory grossly intact  Skin: (-) erythema, (-) warmth, (-) rashes, (-) ulcers, (-) open wounds  Psychiatric: mood/affect WNL, thought content WNL, behavior age appropriate    Labs:        Invalid input(s): [id number]      Recent Labs      05/12/17   1409  05/12/17   1704  05/13/17   0222  05/14/17   0241   SODIUM  137   --   137  135   POTASSIUM  5.5   --   4.0  3.8   CHLORIDE  106   --   103  101   CO2  19*   --   24  25   BUN  113*    --   51*  37*   CREATININE  5.84*   --   3.34*  3.24*   PHOSPHORUS   --   5.4*   --    --    CALCIUM  9.5   --   9.0  9.4     Recent Labs      17   0241   ALTSGPT  17   --    --    ASTSGOT  18   --    --    ALKPHOSPHAT  93   --    --    TBILIRUBIN  0.4   --    --    GLUCOSE  162*  124*  113*     Recent Labs      17   0241   RBC  3.75*  3.76*  3.92*   HEMOGLOBIN  11.6*  11.5*  12.2   HEMATOCRIT  36.2*  34.7*  36.7*   PLATELETCT  34*  33*  45*     Recent Labs      17   0241   WBC  2.4*  2.3*  3.3*   NEUTSPOLYS  67.80  64.40  60.80   LYMPHOCYTES  19.10*  18.70*  18.70*   MONOCYTES  7.80  8.70  10.10   EOSINOPHILS  4.40  7.80*  9.50*   BASOPHILS  0.90  0.40  0.60   ASTSGOT  18   --    --    ALTSGPT  17   --    --    ALKPHOSPHAT  93   --    --    TBILIRUBIN  0.4   --    --            Hemodynamics:  Temp (24hrs), Av.7 °C (98.1 °F), Min:36.4 °C (97.5 °F), Max:37.1 °C (98.7 °F)  Temperature:  (Morning vitals not taken; pt at dialysis)  Pulse  Av.4  Min: 58  Max: 81Heart Rate (Monitored): 71  Blood Pressure : 153/67 mmHg, NIBP: 138/83 mmHg     Respiratory:    Respiration: (!) 11, Pulse Oximetry: 99 %      Fluids:    Intake/Output Summary (Last 24 hours) at 05/15/17 1324  Last data filed at 05/15/17 1006   Gross per 24 hour   Intake    800 ml   Output   1759 ml   Net   -959 ml        GI/Nutrition:  Orders Placed This Encounter   Procedures   • DIET NPO     Standing Status: Standing      Number of Occurrences: 1      Standing Expiration Date:      Order Specific Question:  Restrict to:     Answer:  Sips with Medications [3]     Medical Decision Making, by Problem:  Active Hospital Problems    Diagnosis   • HTN (hypertension) [I10]   • ESRD on hemodialysis (CMS-HCC) [N18.6, Z99.2]  Hyperkalemia  Continue norvasc 5 daily  Losartan restarted  Hemodialysis  Dr. Maxwell notes appreciated.     •  Pancytopenia (CMS-HCC) [D61.818]  edin torrez reviewed  FFup bone marrow biopsy  Dr. Mckeon notes appreciated    DM 2  Diet controlled    Moderate Aortic stenosis  Outpt ffup with cardiology needs to be set up.       Core Measures    Full code  SCD

## 2017-05-15 NOTE — PROCEDURES
DATE OF PROCEDURE: 5/15/2017    PROCEDURE: Bone marrow biopsy with bone marrow aspiration.     REQUESTING PHYSICIAN: Dr. Umana    INDICATIONS: myelodysplastic syndrome    INFORMED CONSENT: Consent signed and on the chart by granddaughter, Karyn.     DESCRIPTION: A time out was called. The patient was placed in the left lateral decubitus position. The right buttock was prepped and draped in a sterile fashion.  1 mL of 1% lidocaine was injected into the skin followed by 3 mL into the periosteum of the posterior ilium bone. Large-bore needle was used to obtain 5 mL of aspirate followed by 5 mL   into a heparinized syringe for flow cytometry. This followed by a  bone marrow biopsy using the Jamshidi needle x 5.     SEDATION USED: 2 mg versed and 50 mcg fentanyl    ESTIMATED BLOOD LOSS: 1 ml

## 2017-05-16 LAB
GLUCOSE BLD-MCNC: 123 MG/DL (ref 65–99)
GLUCOSE BLD-MCNC: 185 MG/DL (ref 65–99)
GLUCOSE BLD-MCNC: 187 MG/DL (ref 65–99)
GLUCOSE BLD-MCNC: 271 MG/DL (ref 65–99)
GLUCOSE BLD-MCNC: 98 MG/DL (ref 65–99)

## 2017-05-16 PROCEDURE — 770006 HCHG ROOM/CARE - MED/SURG/GYN SEMI*

## 2017-05-16 PROCEDURE — 99232 SBSQ HOSP IP/OBS MODERATE 35: CPT | Performed by: FAMILY MEDICINE

## 2017-05-16 PROCEDURE — 82962 GLUCOSE BLOOD TEST: CPT

## 2017-05-16 PROCEDURE — A9270 NON-COVERED ITEM OR SERVICE: HCPCS | Performed by: HOSPITALIST

## 2017-05-16 PROCEDURE — 700102 HCHG RX REV CODE 250 W/ 637 OVERRIDE(OP): Performed by: INTERNAL MEDICINE

## 2017-05-16 PROCEDURE — A9270 NON-COVERED ITEM OR SERVICE: HCPCS | Performed by: INTERNAL MEDICINE

## 2017-05-16 PROCEDURE — 700102 HCHG RX REV CODE 250 W/ 637 OVERRIDE(OP): Performed by: HOSPITALIST

## 2017-05-16 RX ADMIN — LOSARTAN POTASSIUM 100 MG: 50 TABLET, FILM COATED ORAL at 08:18

## 2017-05-16 RX ADMIN — INSULIN LISPRO 1 UNITS: 100 INJECTION, SOLUTION INTRAVENOUS; SUBCUTANEOUS at 20:34

## 2017-05-16 RX ADMIN — AMLODIPINE BESYLATE 5 MG: 5 TABLET ORAL at 08:19

## 2017-05-16 RX ADMIN — CALCITRIOL 0.25 MCG: 0.25 CAPSULE, LIQUID FILLED ORAL at 08:18

## 2017-05-16 RX ADMIN — VENLAFAXINE 37.5 MG: 75 TABLET ORAL at 20:30

## 2017-05-16 RX ADMIN — INSULIN LISPRO 1 UNITS: 100 INJECTION, SOLUTION INTRAVENOUS; SUBCUTANEOUS at 13:08

## 2017-05-16 RX ADMIN — STANDARDIZED SENNA CONCENTRATE AND DOCUSATE SODIUM 2 TABLET: 8.6; 5 TABLET, FILM COATED ORAL at 20:30

## 2017-05-16 RX ADMIN — STANDARDIZED SENNA CONCENTRATE AND DOCUSATE SODIUM 2 TABLET: 8.6; 5 TABLET, FILM COATED ORAL at 08:19

## 2017-05-16 ASSESSMENT — ENCOUNTER SYMPTOMS
EYES NEGATIVE: 1
MUSCULOSKELETAL NEGATIVE: 1
RESPIRATORY NEGATIVE: 1
CARDIOVASCULAR NEGATIVE: 1
GASTROINTESTINAL NEGATIVE: 1
PSYCHIATRIC NEGATIVE: 1
WEAKNESS: 1

## 2017-05-16 NOTE — CARE PLAN
Problem: Communication  Goal: The ability to communicate needs accurately and effectively will improve  Outcome: PROGRESSING AS EXPECTED  Pt is Japanese speaking only. Family at bedside to assist with translation and perform assessments.     Problem: Safety  Goal: Will remain free from injury  Outcome: PROGRESSING AS EXPECTED  Pt remains safe and free from falls this shift. Room clear of any obstacles. Bed in lowest position. Call light within reach. Pt reminded to call for assistance before attempting to get out of bed. Fall precautions in place. Pt does not use call light appropriately. Hourly rounding in place.

## 2017-05-16 NOTE — PROGRESS NOTES
Hospital Medicine Progress Note, Adult, Complex               Author: Malvin Shaffer Date & Time created: 2017  12:09 PM     Interval History:  83YR OLD F WITH PANCYTOPENIA AND ESRD    Review of Systems:  Review of Systems   Unable to perform ROS: acuity of condition       Physical Exam:  Physical Exam   Constitutional: No distress.   HENT:   Head: Normocephalic and atraumatic.   Eyes: Right eye exhibits no discharge. Left eye exhibits no discharge.   Neck: Neck supple. No JVD present.   Cardiovascular: Normal rate and regular rhythm.    Pulmonary/Chest: No stridor. No respiratory distress. She has no wheezes. She has no rales.   Abdominal: Soft. She exhibits no distension. There is no tenderness. There is no rebound.   Musculoskeletal: She exhibits no tenderness.   Neurological: She is alert.   Skin: Skin is warm and dry. She is not diaphoretic.       Labs:        Invalid input(s): [id number]      Recent Labs      17   0241   SODIUM  135   POTASSIUM  3.8   CHLORIDE  101   CO2  25   BUN  37*   CREATININE  3.24*   CALCIUM  9.4     Recent Labs      17   0241   GLUCOSE  113*     Recent Labs      17   0241   RBC  3.92*   HEMOGLOBIN  12.2   HEMATOCRIT  36.7*   PLATELETCT  45*     Recent Labs      17   0241   WBC  3.3*   NEUTSPOLYS  60.80   LYMPHOCYTES  18.70*   MONOCYTES  10.10   EOSINOPHILS  9.50*   BASOPHILS  0.60           Hemodynamics:  Temp (24hrs), Av.8 °C (98.2 °F), Min:36.5 °C (97.7 °F), Max:37.2 °C (98.9 °F)  Temperature: 37.2 °C (98.9 °F)  Pulse  Av.2  Min: 58  Max: 86   Blood Pressure : 131/71 mmHg     Respiratory:    Respiration: 19, Pulse Oximetry: 97 %        RUL Breath Sounds: Clear, RML Breath Sounds: Diminished, RLL Breath Sounds: Diminished, KATHARINE Breath Sounds: Clear, LLL Breath Sounds: Diminished  Fluids:  No intake or output data in the 24 hours ending 17 1209     GI/Nutrition:  Orders Placed This Encounter   Procedures   • DIET ORDER     Standing  Status: Standing      Number of Occurrences: 1      Standing Expiration Date:      Order Specific Question:  Diet:     Answer:  Renal [8]     Medical Decision Making, by Problem:  Active Hospital Problems    Diagnosis   • HTN (hypertension) [I10]   • ESRD on hemodialysis (CMS-[facility]) [N18.6, Z99.2]   • Pancytopenia (CMS-[facility]) [D61.818]     83YR OLD F WITH PANCYTOPENIA  ONCOLOGY INPUT IS NOTED  S/P BONE MARROW BIOPSY    ESRD  NEPHROLOGY INPUT IS NOTED  ON DIALYSIS      HTN  AMLODIPINE  LOSARTAN  CONTROLLED    DISPOSITION  PT AND OT        Martino catheter: No Martino        DVT prophylaxis - mechanical: Contra-indicated

## 2017-05-16 NOTE — CARE PLAN
Problem: Safety  Goal: Will remain free from injury  Outcome: PROGRESSING AS EXPECTED  Bed alarm in use. Educated family on fall safety. Treaded socks on.     Problem: Bowel/Gastric:  Goal: Normal bowel function is maintained or improved  Outcome: PROGRESSING AS EXPECTED  Last bm 5/14. Taking senna as ordered.

## 2017-05-16 NOTE — PROGRESS NOTES
[facility] -  PROGRESS NOTE               Author: LUIS Russell Date & Time: 5/16/2017  12:06 PM   Supervising Physician: Zari Lucas MD    HPI:  This is an 83-year-old  female with a past medical history significant for end-stage renal disease, on chronic hemodialysis for the past 2 years, hypertension, diabetes mellitus type 2,   anemia secondary to chronic kidney disease, renal osteodystrophy, depression, who was admitted with complaints of neck pain and having missed outpatient hemodialysis for the past 2 weeks.  Patient is primarily German-speaking.  The history was obtained with the help of her family who is at bedside who were able to translate.  The patient recently moved from [place] to [place] to live with her daughter.  She has been tentatively accepted at outpatient dialysis at [facility]; however, they were unable to accept her and confirmed her chair because of the lack of a tuberculosis test results that they require.  Patient had last dialyzed about 1 week ago when she was living in [place], although this time line is somewhat uncertain as she was living with her brother in [place] and it is unclear whether she was dialyzed 1 week or a few days ago.  At any rate, she was unable to be dialyzed as an outpatient today when she presented to the [place] [facility] because of lack of the tuberculosis test and so she was brought to the emergency room for further evaluation since she has missed her dialysis for a week as well as because of her neck pain.  Labs in the emergency room revealed an elevated potassium of 5.5 and an elevated BUN of 113.  Patient reports that she is feeling okay.  She denies any chest pain or shortness of breath.  No lightheadedness.  She is not having any lower extremity edema.  According to her family, patient has stopped many of her medications.  She is only taking losartan for her blood pressure as  well as venlafaxine for depression.  She has a history of diabetes mellitus type 2 that ultimately led to her end-stage renal disease, but according to her family, she is not taking any pills or insulin at this time.  She is also dialyzing via a right chest PermCath and according to the family, she has had a catheter in for about 2 years.  There have been no attempts to create a vascular access such as an AV fistula or an AV graft according to her family at bedside.  They would like to proceed with vascular access creation if possible.      DAILY NEPHROLOGY SUMMARY:  5/13/17 - No events, dialyzed yesterday and feels better today, no neck pain today, BP elevated this am, BUN improved after HD yesterday, only tolerated 300cc fluid removal with HD yesterday due to cramping  5/14/17 - No events, feels well, tolerated HD yesterday with 900cc UF, BP stable, platelets slight improved at 45K, WBC count slightly improved at 3.4  5/15/17 - NAEO, SOD, Qb300, 2L UF goal, complains of some cramping, UF on hold and had to terminate 30 min early  5/16/17 - NAEO, HD yest UF ~1L. No new labs, there was a mild improvement in WBC. Bone marrow biopsy done yest.      Review of Systems   Constitutional: Positive for malaise/fatigue.   HENT: Negative.    Eyes: Negative.    Respiratory: Negative.    Cardiovascular: Negative.    Gastrointestinal: Negative.    Musculoskeletal: Negative.    Skin: Negative.    Neurological: Positive for weakness.   Endo/Heme/Allergies: Negative.    Psychiatric/Behavioral: Negative.    All other systems reviewed and are negative.        Physical Exam   Constitutional: She is oriented to person, place, and time. She appears cachectic. She is active. She appears ill.   HENT:   Head: Normocephalic and atraumatic.   Eyes: Conjunctivae are normal. No scleral icterus.   Neck: Neck supple. No tracheal deviation present.   Cardiovascular: Normal rate and regular rhythm.    Murmur heard.  Pulmonary/Chest: Effort normal  and breath sounds normal.   Abdominal: Soft. Bowel sounds are normal.   Musculoskeletal: She exhibits no edema or tenderness.   Neurological: She is alert and oriented to person, place, and time. She exhibits normal muscle tone.   Skin: Skin is warm and dry.   Psychiatric: She has a normal mood and affect. Her behavior is normal.   Nursing note and vitals reviewed.        PAST FAMILY HISTORY: Reviewed and Unchanged  SOCIAL HISTORY: Reviewed and Unchanged  CURRENT MEDICATIONS: Reviewed  LABORATORY RESULTS: Reviewed  IMAGING STUDIES: Reviewed      Fluids:         IMPRESSION:  - ESRD    * Etiology likely 2/2 HTN/DM  - Pancytopenia    * Etiology unclear, concern for MDS  - Hyperkalemia  - HTN  - type 2 DM  - Anemia of CKD  - Renal Osteodystrophy  - Mild-moderate PCM    ASSESSMENT:  - GFR: HD dependent  - Urine: oligoanuric  - BP: Goal < 150/90  - Volume: euvolemic  - Acid/Base: no sig. acid base disturbance  - Electrolytes: stable  - Anemia: Goal Hgb 10-11  - MBD: Ca normal, PO4 elevated, PTH elevated, Vit D low  - HD Access: [facility]    PLAN:  - No HD today  - MWF iHD during stay  - UF as tolerated  - No heparin, ACD in catheter port for locking  - Quantiferon gold test pending  - Due to her fraility pursuing permanent access with AVF/AVG needs to be re-evaluated with the family  - [facility] when stable for D/C  - Dose all meds per ESRD guidelines    Thank you,

## 2017-05-17 LAB
ANION GAP SERPL CALC-SCNC: 12 MMOL/L (ref 0–11.9)
BASOPHILS # BLD AUTO: 0.2 % (ref 0–1.8)
BASOPHILS # BLD: 0.01 K/UL (ref 0–0.12)
BUN SERPL-MCNC: 51 MG/DL (ref 8–22)
CALCIUM SERPL-MCNC: 9 MG/DL (ref 8.5–10.5)
CHLORIDE SERPL-SCNC: 98 MMOL/L (ref 96–112)
CO2 SERPL-SCNC: 24 MMOL/L (ref 20–33)
CREAT SERPL-MCNC: 5.19 MG/DL (ref 0.5–1.4)
EOSINOPHIL # BLD AUTO: 0.28 K/UL (ref 0–0.51)
EOSINOPHIL NFR BLD: 6.5 % (ref 0–6.9)
ERYTHROCYTE [DISTWIDTH] IN BLOOD BY AUTOMATED COUNT: 47 FL (ref 35.9–50)
FERRITIN SERPL-MCNC: 470.4 NG/ML (ref 10–291)
GFR SERPL CREATININE-BSD FRML MDRD: 8 ML/MIN/1.73 M 2
GLUCOSE BLD-MCNC: 115 MG/DL (ref 65–99)
GLUCOSE BLD-MCNC: 127 MG/DL (ref 65–99)
GLUCOSE BLD-MCNC: 141 MG/DL (ref 65–99)
GLUCOSE BLD-MCNC: 269 MG/DL (ref 65–99)
GLUCOSE SERPL-MCNC: 130 MG/DL (ref 65–99)
HCT VFR BLD AUTO: 33.4 % (ref 37–47)
HGB BLD-MCNC: 10.8 G/DL (ref 12–16)
HGB RETIC QN AUTO: 35.7 PG/CELL (ref 29–35)
IMM GRANULOCYTES # BLD AUTO: 0.01 K/UL (ref 0–0.11)
IMM GRANULOCYTES NFR BLD AUTO: 0.2 % (ref 0–0.9)
IMM RETICS NFR: 2.5 % (ref 9.3–17.4)
INR PPP: 1.18 (ref 0.87–1.13)
IRON SATN MFR SERPL: 18 % (ref 15–55)
IRON SERPL-MCNC: 44 UG/DL (ref 40–170)
LDH SERPL-CCNC: 153 U/L (ref 107–266)
LYMPHOCYTES # BLD AUTO: 0.69 K/UL (ref 1–4.8)
LYMPHOCYTES NFR BLD: 15.9 % (ref 22–41)
MCH RBC QN AUTO: 30.6 PG (ref 27–33)
MCHC RBC AUTO-ENTMCNC: 32.3 G/DL (ref 33.6–35)
MCV RBC AUTO: 94.6 FL (ref 81.4–97.8)
MONOCYTES # BLD AUTO: 0.46 K/UL (ref 0–0.85)
MONOCYTES NFR BLD AUTO: 10.6 % (ref 0–13.4)
NEUTROPHILS # BLD AUTO: 2.88 K/UL (ref 2–7.15)
NEUTROPHILS NFR BLD: 66.6 % (ref 44–72)
NRBC # BLD AUTO: 0 K/UL
NRBC BLD AUTO-RTO: 0 /100 WBC
PLATELET # BLD AUTO: 38 K/UL (ref 164–446)
POTASSIUM SERPL-SCNC: 4.3 MMOL/L (ref 3.6–5.5)
PROTHROMBIN TIME: 15.4 SEC (ref 12–14.6)
RBC # BLD AUTO: 3.53 M/UL (ref 4.2–5.4)
RETICS # AUTO: 0.02 M/UL (ref 0.04–0.06)
RETICS/RBC NFR: 0.6 % (ref 0.8–2.1)
SODIUM SERPL-SCNC: 134 MMOL/L (ref 135–145)
TIBC SERPL-MCNC: 238 UG/DL (ref 250–450)
WBC # BLD AUTO: 4.3 K/UL (ref 4.8–10.8)

## 2017-05-17 PROCEDURE — 83540 ASSAY OF IRON: CPT

## 2017-05-17 PROCEDURE — 82668 ASSAY OF ERYTHROPOIETIN: CPT

## 2017-05-17 PROCEDURE — 85025 COMPLETE CBC W/AUTO DIFF WBC: CPT

## 2017-05-17 PROCEDURE — 85610 PROTHROMBIN TIME: CPT

## 2017-05-17 PROCEDURE — G8979 MOBILITY GOAL STATUS: HCPCS | Mod: CI

## 2017-05-17 PROCEDURE — G8988 SELF CARE GOAL STATUS: HCPCS | Mod: CI

## 2017-05-17 PROCEDURE — 97165 OT EVAL LOW COMPLEX 30 MIN: CPT

## 2017-05-17 PROCEDURE — 700102 HCHG RX REV CODE 250 W/ 637 OVERRIDE(OP): Performed by: HOSPITALIST

## 2017-05-17 PROCEDURE — 85046 RETICYTE/HGB CONCENTRATE: CPT

## 2017-05-17 PROCEDURE — 700102 HCHG RX REV CODE 250 W/ 637 OVERRIDE(OP): Performed by: INTERNAL MEDICINE

## 2017-05-17 PROCEDURE — G8978 MOBILITY CURRENT STATUS: HCPCS | Mod: CJ

## 2017-05-17 PROCEDURE — A9270 NON-COVERED ITEM OR SERVICE: HCPCS | Performed by: HOSPITALIST

## 2017-05-17 PROCEDURE — 36415 COLL VENOUS BLD VENIPUNCTURE: CPT

## 2017-05-17 PROCEDURE — G8987 SELF CARE CURRENT STATUS: HCPCS | Mod: CJ

## 2017-05-17 PROCEDURE — A9270 NON-COVERED ITEM OR SERVICE: HCPCS | Performed by: INTERNAL MEDICINE

## 2017-05-17 PROCEDURE — 770006 HCHG ROOM/CARE - MED/SURG/GYN SEMI*

## 2017-05-17 PROCEDURE — 99232 SBSQ HOSP IP/OBS MODERATE 35: CPT | Performed by: FAMILY MEDICINE

## 2017-05-17 PROCEDURE — 80048 BASIC METABOLIC PNL TOTAL CA: CPT

## 2017-05-17 PROCEDURE — 97161 PT EVAL LOW COMPLEX 20 MIN: CPT

## 2017-05-17 PROCEDURE — 82962 GLUCOSE BLOOD TEST: CPT

## 2017-05-17 PROCEDURE — 82728 ASSAY OF FERRITIN: CPT

## 2017-05-17 PROCEDURE — 83615 LACTATE (LD) (LDH) ENZYME: CPT

## 2017-05-17 PROCEDURE — 83550 IRON BINDING TEST: CPT

## 2017-05-17 RX ADMIN — CALCITRIOL 0.25 MCG: 0.25 CAPSULE, LIQUID FILLED ORAL at 07:53

## 2017-05-17 RX ADMIN — VENLAFAXINE 37.5 MG: 75 TABLET ORAL at 20:13

## 2017-05-17 RX ADMIN — STANDARDIZED SENNA CONCENTRATE AND DOCUSATE SODIUM 2 TABLET: 8.6; 5 TABLET, FILM COATED ORAL at 20:13

## 2017-05-17 RX ADMIN — STANDARDIZED SENNA CONCENTRATE AND DOCUSATE SODIUM 2 TABLET: 8.6; 5 TABLET, FILM COATED ORAL at 09:00

## 2017-05-17 RX ADMIN — INSULIN LISPRO 3 UNITS: 100 INJECTION, SOLUTION INTRAVENOUS; SUBCUTANEOUS at 12:05

## 2017-05-17 RX ADMIN — AMLODIPINE BESYLATE 5 MG: 5 TABLET ORAL at 07:53

## 2017-05-17 RX ADMIN — LOSARTAN POTASSIUM 100 MG: 50 TABLET, FILM COATED ORAL at 07:53

## 2017-05-17 ASSESSMENT — ENCOUNTER SYMPTOMS
NECK PAIN: 0
WEIGHT LOSS: 0
CHILLS: 0
TINGLING: 0
PSYCHIATRIC NEGATIVE: 1
EYES NEGATIVE: 1
RESPIRATORY NEGATIVE: 1
CARDIOVASCULAR NEGATIVE: 1
NAUSEA: 0
GASTROINTESTINAL NEGATIVE: 1
ORTHOPNEA: 0
HEMOPTYSIS: 0
VOMITING: 0
SPUTUM PRODUCTION: 0
DOUBLE VISION: 0
PHOTOPHOBIA: 0
FEVER: 0
BACK PAIN: 0
BLURRED VISION: 0
HEADACHES: 0
MUSCULOSKELETAL NEGATIVE: 1
PALPITATIONS: 0
DIZZINESS: 0
HEARTBURN: 0
MYALGIAS: 0
WEAKNESS: 1
COUGH: 0
TREMORS: 0

## 2017-05-17 ASSESSMENT — PAIN SCALES - GENERAL: PAINLEVEL_OUTOF10: 0

## 2017-05-17 ASSESSMENT — COGNITIVE AND FUNCTIONAL STATUS - GENERAL
DAILY ACTIVITIY SCORE: 23
DRESSING REGULAR LOWER BODY CLOTHING: A LITTLE
SUGGESTED CMS G CODE MODIFIER DAILY ACTIVITY: CI

## 2017-05-17 NOTE — PROGRESS NOTES
[facility] -  PROGRESS NOTE               Author: Zari Lucas M.D. Date & Time: 5/17/2017  1:14 PM       HPI:  This is an 83-year-old  female with a past medical history significant for end-stage renal disease, on chronic hemodialysis for the past 2 years, hypertension, diabetes mellitus type 2,   anemia secondary to chronic kidney disease, renal osteodystrophy, depression, who was admitted with complaints of neck pain and having missed outpatient hemodialysis for the past 2 weeks.  Patient is primarily Mauritanian-speaking.  The history was obtained with the help of her family who is at bedside who were able to translate.  The patient recently moved from [place] to [place] to live with her daughter.  She has been tentatively accepted at outpatient dialysis at [facility]; however, they were unable to accept her and confirmed her chair because of the lack of a tuberculosis test results that they require.  Patient had last dialyzed about 1 week ago when she was living in [place], although this time line is somewhat uncertain as she was living with her brother in [place] and it is unclear whether she was dialyzed 1 week or a few days ago.  At any rate, she was unable to be dialyzed as an outpatient today when she presented to the [place] [facility] because of lack of the tuberculosis test and so she was brought to the emergency room for further evaluation since she has missed her dialysis for a week as well as because of her neck pain.  Labs in the emergency room revealed an elevated potassium of 5.5 and an elevated BUN of 113.  Patient reports that she is feeling okay.  She denies any chest pain or shortness of breath.  No lightheadedness.  She is not having any lower extremity edema.  According to her family, patient has stopped many of her medications.  She is only taking losartan for her blood pressure as well as venlafaxine for depression.  She has a  history of diabetes mellitus type 2 that ultimately led to her end-stage renal disease, but according to her family, she is not taking any pills or insulin at this time.  She is also dialyzing via a right chest PermCath and according to the family, she has had a catheter in for about 2 years.  There have been no attempts to create a vascular access such as an AV fistula or an AV graft according to her family at bedside.  They would like to proceed with vascular access creation if possible.      DAILY NEPHROLOGY SUMMARY:  5/13/17 - No events, dialyzed yesterday and feels better today, no neck pain today, BP elevated this am, BUN improved after HD yesterday, only tolerated 300cc fluid removal with HD yesterday due to cramping  5/14/17 - No events, feels well, tolerated HD yesterday with 900cc UF, BP stable, platelets slight improved at 45K, WBC count slightly improved at 3.4  5/15/17 - NAEO, SOD, Qb300, 2L UF goal, complains of some cramping, UF on hold and had to terminate 30 min early  5/16/17 - NAEO, HD yest UF ~1L. No new labs, there was a mild improvement in WBC. Bone marrow biopsy done yest.  5/17/17 - NAEO, no complaints, refusing dialysis today, was crying on HD at last session due to cramping      Review of Systems   Constitutional: Positive for malaise/fatigue.   HENT: Negative.    Eyes: Negative.    Respiratory: Negative.    Cardiovascular: Negative.    Gastrointestinal: Negative.    Musculoskeletal: Negative.    Skin: Negative.    Neurological: Positive for weakness.   Endo/Heme/Allergies: Negative.    Psychiatric/Behavioral: Negative.    All other systems reviewed and are negative.        Physical Exam   Constitutional: She is oriented to person, place, and time. She appears cachectic. She is active. She appears ill.   HENT:   Head: Normocephalic and atraumatic.   Eyes: Conjunctivae are normal. No scleral icterus.   Neck: Neck supple. No tracheal deviation present.   Cardiovascular: Normal rate and  regular rhythm.    Murmur heard.  Pulmonary/Chest: Effort normal and breath sounds normal.   Abdominal: Soft. Bowel sounds are normal.   Musculoskeletal: She exhibits no edema or tenderness.   Neurological: She is alert and oriented to person, place, and time. She exhibits normal muscle tone.   Skin: Skin is warm and dry.   Psychiatric: She has a normal mood and affect. Her behavior is normal.   Nursing note and vitals reviewed.        PAST FAMILY HISTORY: Reviewed and Unchanged  SOCIAL HISTORY: Reviewed and Unchanged  CURRENT MEDICATIONS: Reviewed  LABORATORY RESULTS: Reviewed  IMAGING STUDIES: Reviewed      Fluids:         IMPRESSION:  - ESRD    * Etiology likely 2/2 HTN/DM  - Pancytopenia    * Etiology unclear, concern for MDS  - Hyperkalemia  - HTN  - type 2 DM  - Anemia of CKD  - Renal Osteodystrophy  - Mild-moderate PCM    ASSESSMENT:  - GFR: HD dependent  - Urine: oligoanuric  - BP: Goal < 150/90  - Volume: euvolemic  - Acid/Base: no sig. acid base disturbance  - Electrolytes: stable  - Anemia: Goal Hgb 10-11  - MBD: Ca normal, PO4 elevated, PTH elevated, Vit D low  - HD Access: [facility]    PLAN:  - No HD today  - short iHD in AM tomorrow if she will agree, otherwise next one on Friday as scheduled  - UF as tolerated  - No heparin, ACD in catheter port for locking  - Due to her fraility pursuing permanent access with AVF/AVG needs to be re-evaluated with the family  - [facility] set up for 5/19/17 start date, ok to transition to outpatient care from our standpoint today or tomorrow  - Dose all meds per ESRD guidelines

## 2017-05-17 NOTE — PROGRESS NOTES
HEMATOLOGY-ONCOLOGY PROGRESS NOTE    Subjective:  No overnight events. No new complains. No bleeding   Here to discuss BMBX results ordered by my partner Dr. Corrigan,     Objective:  Medications reviewed and notable for:  Current Facility-Administered Medications   Medication Dose   • heparin 1000 units/mL injection 3,300 Units  3,300 Units   • venlafaxine (EFFEXOR) tablet 37.5 mg  37.5 mg   • losartan (COZAAR) tablet 100 mg  100 mg   • amlodipine (NORVASC) tablet 5 mg  5 mg   • calcitRIOL (ROCALTROL) capsule 0.25 mcg  0.25 mcg   • senna-docusate (PERICOLACE or SENOKOT S) 8.6-50 MG per tablet 2 Tab  2 Tab    And   • polyethylene glycol/lytes (MIRALAX) PACKET 1 Packet  1 Packet    And   • magnesium hydroxide (MILK OF MAGNESIA) suspension 30 mL  30 mL    And   • bisacodyl (DULCOLAX) suppository 10 mg  10 mg   • ondansetron (ZOFRAN) syringe/vial injection 4 mg  4 mg   • ondansetron (ZOFRAN ODT) dispertab 4 mg  4 mg   • lorazepam (ATIVAN) tablet 0.5 mg  0.5 mg    Or   • lorazepam (ATIVAN) injection 0.5 mg  0.5 mg   • acetaminophen (TYLENOL) tablet 650 mg  650 mg   • insulin lispro (HUMALOG) injection 1-6 Units  1-6 Units   • glucose 4 g chewable tablet 16 g  16 g    And   • dextrose 50% (D50W) injection 25 mL  25 mL   • anticoagulant cit dextrose soln A (ACD) 10 mL in bottle/bag empty 10 mL vial         ROS:   I did do a 10 point system review which is negative except as mentioned above     Blood pressure 121/77, pulse 87, temperature 37.4 °C (99.4 °F), resp. rate 18, height 1.524 m (5'), weight 50.6 kg (111 lb 8.8 oz), SpO2 99 %, not currently breastfeeding.    General:  comfortable, NAD  HEENT:  sclera anicteric, pupils equal, round, reactive to light, oral cavity and oropharynx clear, mucous membranes moist  Neck:   supple, no lymphadenopathy  Cor:   regular rate and rhythm, no murmurs, rubs, or gallops  Pulm:   clear to auscultation bilaterally  Abd:   bowel sounds present, soft, nontender, nondistended, no  palpable masses or organomegaly  Extremities:  warm, no lower extremity edema  Neurologic:  A&O x 3  Pyschiatric:  Appropriate mood and affect    Labs reviewed and notable for:  Recent Labs      05/17/17   0237   WBC  4.3*   RBC  3.53*   HEMOGLOBIN  10.8*   HEMATOCRIT  33.4*   MCV  94.6   MCH  30.6   MCHC  32.3*   RDW  47.0   PLATELETCT  38*         .@CMP  Recent Results (from the past 24 hour(s))   ACCU-CHEK GLUCOSE    Collection Time: 05/16/17  1:07 PM   Result Value Ref Range    Glucose - Accu-Ck 187 (H) 65 - 99 mg/dL   ACCU-CHEK GLUCOSE    Collection Time: 05/16/17  4:42 PM   Result Value Ref Range    Glucose - Accu-Ck 123 (H) 65 - 99 mg/dL   ACCU-CHEK GLUCOSE    Collection Time: 05/16/17  8:33 PM   Result Value Ref Range    Glucose - Accu-Ck 185 (H) 65 - 99 mg/dL   CBC WITH DIFFERENTIAL    Collection Time: 05/17/17  2:37 AM   Result Value Ref Range    WBC 4.3 (L) 4.8 - 10.8 K/uL    RBC 3.53 (L) 4.20 - 5.40 M/uL    Hemoglobin 10.8 (L) 12.0 - 16.0 g/dL    Hematocrit 33.4 (L) 37.0 - 47.0 %    MCV 94.6 81.4 - 97.8 fL    MCH 30.6 27.0 - 33.0 pg    MCHC 32.3 (L) 33.6 - 35.0 g/dL    RDW 47.0 35.9 - 50.0 fL    Platelet Count 38 (LL) 164 - 446 K/uL    Neutrophils-Polys 66.60 44.00 - 72.00 %    Lymphocytes 15.90 (L) 22.00 - 41.00 %    Monocytes 10.60 0.00 - 13.40 %    Eosinophils 6.50 0.00 - 6.90 %    Basophils 0.20 0.00 - 1.80 %    Immature Granulocytes 0.20 0.00 - 0.90 %    Nucleated RBC 0.00 /100 WBC    Neutrophils (Absolute) 2.88 2.00 - 7.15 K/uL    Lymphs (Absolute) 0.69 (L) 1.00 - 4.80 K/uL    Monos (Absolute) 0.46 0.00 - 0.85 K/uL    Eos (Absolute) 0.28 0.00 - 0.51 K/uL    Baso (Absolute) 0.01 0.00 - 0.12 K/uL    Immature Granulocytes (abs) 0.01 0.00 - 0.11 K/uL    NRBC (Absolute) 0.00 K/uL   BASIC METABOLIC PANEL    Collection Time: 05/17/17  2:37 AM   Result Value Ref Range    Sodium 134 (L) 135 - 145 mmol/L    Potassium 4.3 3.6 - 5.5 mmol/L    Chloride 98 96 - 112 mmol/L    Co2 24 20 - 33 mmol/L    Glucose 130  (H) 65 - 99 mg/dL    Bun 51 (H) 8 - 22 mg/dL    Creatinine 5.19 (HH) 0.50 - 1.40 mg/dL    Calcium 9.0 8.5 - 10.5 mg/dL    Anion Gap 12.0 (H) 0.0 - 11.9   ESTIMATED GFR    Collection Time: 05/17/17  2:37 AM   Result Value Ref Range    GFR If African American 10 (A) >60 mL/min/1.73 m 2    GFR If Non  8 (A) >60 mL/min/1.73 m 2   ACCU-CHEK GLUCOSE    Collection Time: 05/17/17  6:16 AM   Result Value Ref Range    Glucose - Accu-Ck 127 (H) 65 - 99 mg/dL       Pathology:   BMBX on 5/15/17                               BONE MARROW REPORT    FINAL DIAGNOSIS:    Peripheral blood smear and CBC:         Borderline normocytic slightly hypochromic anemia.         Mild absolute neutropenia with slight dysgranulopoiesis.         Marked thrombocytopenia with prominent platelet clumping.  Bone marrow aspirate, clot, and core biopsy:         Normocellular marrow (20%) with trilineage hematopoiesis.         Relative erythroid hyperplasia, mild.         Increased storage iron.         No morphologic or flow cytometric evidence of hematologic or          metastatic malignancy, including acute leukemia or          myelodysplastic syndrome.         See comment.  Cytogenetic pending     U/S:     1.  Heterogeneous nodular appearing liver consistent with cirrhosis.    2.  Mild splenomegaly and splenic varices are noted consistent with portal hypertension.    3.  Increased renal echogenicity consistent with medical renal disease.    4.  Several cysts are noted in each kidney.      Assessment and Recommendations:  - Cytopenias mainly thrombocytopenia and leukopenia .  No B symptoms.  Normal B12, folic acid, TSH, and HIV.  U/S on  5/15/17 suggestive of cirrhosis /splenomegaly and portal HTN.  BMBX on 5/15/17 showed occasional megakaryocytes, N morphology. No dysplasia, no leukemia. Flow N. Cytogenetics pending. Peripheral showed platelet clumping. Her WBC normalized. PLT count stable 30,000 range.     2.  ESRD on HD  3.  HTN,  poorly controlled  4.  Diabetes mellitus.  5. Normocytic anemia : multifactorial likely in this case Epo deficiency     Plan:   - I reviewed the BMBX results that showed no primary hematological condition. No MDS or leukemia.  Also megakaryocytes appear slightly decreased but normal morphology.   Likely the cytopenias is due to underlying cirrhosis - hypersplenism.   Transfuse if they drop < 20,000 or any bleeding.   - Her WBC improved spontaneously to normal range.   - Since her HGB dropped. I did request to do Ret count, LDH level, Epo level, iron studies, ferritin. Her recent B12, folic acid N .  Goal to keep levels > 10.0   - Will follow up on the results    Please call with any questions [phone]  I tried calling her grand daughter Sadie, no answer       Abigail Manley MD  [facility]   [phone]

## 2017-05-17 NOTE — THERAPY
"Physical Therapy Evaluation completed.   Bed Mobility:  Supine to Sit:  (UP in chair)  Transfers: Sit to Stand: Contact Guard Assist  Gait:   Side steps at chair only with minimal assist due to R knee pain   Plan of Care: Will benefit from Physical Therapy 2 times per week and Plan to complete next treatment by Monday 5/22  Discharge Recommendations: Equipment: Will Continue to Assess for Equipment Needs. Post-acute therapy Discharge to home with outpatient or home health for additional skilled therapy services.    See \"Rehab Therapy-Acute\" Patient Summary Report for complete documentation.     "

## 2017-05-17 NOTE — CARE PLAN
Problem: Safety  Goal: Will remain free from falls  Bed alarm in place, bed in low and locked position, non-slip socks on patient, hourly rounding in place.     Problem: Psychosocial Needs:  Goal: Level of anxiety will decrease  Pts family at bedside keeping patient calm and comfortable.

## 2017-05-17 NOTE — PROGRESS NOTES
Hospital Medicine Progress Note, Adult, Complex               Author: Malvin Shaffer Date & Time created: 5/17/2017  12:09 PM     Interval History:    83YR OLD F WITH PANCYTOPENIA AND ESRD,NEEDS ACCEPTANCE FOR  DIALYSIS BY OUTSIDE FACILITY  Review of Systems:  Review of Systems   Constitutional: Negative for fever, chills and weight loss.   HENT: Negative for hearing loss and tinnitus.    Eyes: Negative for blurred vision, double vision and photophobia.   Respiratory: Negative for cough, hemoptysis and sputum production.    Cardiovascular: Negative for chest pain, palpitations and orthopnea.   Gastrointestinal: Negative for heartburn, nausea and vomiting.   Genitourinary: Negative for dysuria, urgency and frequency.   Musculoskeletal: Negative for myalgias, back pain and neck pain.   Skin: Negative for itching and rash.   Neurological: Negative for dizziness, tingling, tremors and headaches.       Physical Exam:  Physical Exam   Constitutional: No distress.   HENT:   Head: Normocephalic and atraumatic.   Eyes: Right eye exhibits no discharge. Left eye exhibits no discharge.   Neck: Neck supple. No JVD present.   Cardiovascular:   Murmur heard.  Pulmonary/Chest: No stridor. No respiratory distress. She has no wheezes. She has no rales.   Abdominal: Soft. She exhibits no distension. There is no tenderness. There is no rebound.   Musculoskeletal: She exhibits no tenderness.   Neurological: She is alert.   Skin: Skin is warm and dry. She is not diaphoretic.       Labs:        Invalid input(s): [id number]      Recent Labs      05/17/17 0237   SODIUM  134*   POTASSIUM  4.3   CHLORIDE  98   CO2  24   BUN  51*   CREATININE  5.19*   CALCIUM  9.0     Recent Labs      05/17/17 0237   GLUCOSE  130*     Recent Labs      05/17/17 0237   RBC  3.53*   HEMOGLOBIN  10.8*   HEMATOCRIT  33.4*   PLATELETCT  38*     Recent Labs      05/17/17 0237   WBC  4.3*   NEUTSPOLYS  66.60   LYMPHOCYTES  15.90*   MONOCYTES  10.60    EOSINOPHILS  6.50   BASOPHILS  0.20           Hemodynamics:  Temp (24hrs), Av.1 °C (98.7 °F), Min:36.8 °C (98.3 °F), Max:37.4 °C (99.4 °F)  Temperature: 37.4 °C (99.4 °F)  Pulse  Av  Min: 58  Max: 134   Blood Pressure : 121/77 mmHg     Respiratory:    Respiration: 18, Pulse Oximetry: 99 %        RUL Breath Sounds: Clear, RML Breath Sounds: Diminished, RLL Breath Sounds: Diminished, KATHARINE Breath Sounds: Clear, LLL Breath Sounds: Diminished  Fluids:    Intake/Output Summary (Last 24 hours) at 17 1209  Last data filed at 17 0400   Gross per 24 hour   Intake    300 ml   Output      0 ml   Net    300 ml        GI/Nutrition:  Orders Placed This Encounter   Procedures   • DIET ORDER     Standing Status: Standing      Number of Occurrences: 1      Standing Expiration Date:      Order Specific Question:  Diet:     Answer:  Renal [8]     Medical Decision Making, by Problem:  Active Hospital Problems    Diagnosis   • HTN (hypertension) [I10]   • ESRD on hemodialysis (CMS-HCC) [N18.6, Z99.2]   • Pancytopenia (CMS-HCC) [D61.818]     83YR OLD F WITH PANCYTOPENIA  ONCOLOGY INPUT IS NOTED  S/P BONE MARROW BIOPSY  MOST LIKELY 2ND TO LIVER CIRRHOSIS    ESRD  NEPHROLOGY INPUT IS NOTED  ON DIALYSIS  ONCE A DIALYSIS CENTER ACCEPTS HER IN THE OUTPT  SETTING THEN SHE WILL BE DISCHARGED  D/W THE STAFF DURING THE ROUNDS      HTN  AMLODIPINE  LOSARTAN  CONTROLLED    DISPOSITION  PT AND OT      Martino catheter: No Martino      DVT Prophylaxis: Contraindicated - High bleeding risk

## 2017-05-17 NOTE — PROGRESS NOTES
Called in @ around 0745 for HD. Received call back from Primary Nurse. Patient refusing HD @ this time. Will notify nephrologist.

## 2017-05-18 VITALS
HEART RATE: 78 BPM | WEIGHT: 153.22 LBS | OXYGEN SATURATION: 95 % | DIASTOLIC BLOOD PRESSURE: 85 MMHG | TEMPERATURE: 99.6 F | BODY MASS INDEX: 30.08 KG/M2 | SYSTOLIC BLOOD PRESSURE: 131 MMHG | RESPIRATION RATE: 17 BRPM | HEIGHT: 60 IN

## 2017-05-18 LAB
ALBUMIN SERPL BCP-MCNC: 3.6 G/DL (ref 3.2–4.9)
ALBUMIN/GLOB SERPL: 1.3 G/DL
ALP SERPL-CCNC: 97 U/L (ref 30–99)
ALT SERPL-CCNC: 16 U/L (ref 2–50)
ANION GAP SERPL CALC-SCNC: 13 MMOL/L (ref 0–11.9)
APTT PPP: 34.7 SEC (ref 24.7–36)
AST SERPL-CCNC: 20 U/L (ref 12–45)
BASOPHILS # BLD AUTO: 0.3 % (ref 0–1.8)
BASOPHILS # BLD: 0.01 K/UL (ref 0–0.12)
BILIRUB SERPL-MCNC: 0.6 MG/DL (ref 0.1–1.5)
BUN SERPL-MCNC: 74 MG/DL (ref 8–22)
CALCIUM SERPL-MCNC: 9 MG/DL (ref 8.5–10.5)
CHLORIDE SERPL-SCNC: 98 MMOL/L (ref 96–112)
CO2 SERPL-SCNC: 22 MMOL/L (ref 20–33)
COMMENT 1642: NORMAL
CREAT SERPL-MCNC: 5.93 MG/DL (ref 0.5–1.4)
EOSINOPHIL # BLD AUTO: 0.28 K/UL (ref 0–0.51)
EOSINOPHIL NFR BLD: 7.7 % (ref 0–6.9)
EPO SERPL-ACNC: 14 MU/ML (ref 4–27)
ERYTHROCYTE [DISTWIDTH] IN BLOOD BY AUTOMATED COUNT: 47.2 FL (ref 35.9–50)
GFR SERPL CREATININE-BSD FRML MDRD: 7 ML/MIN/1.73 M 2
GLOBULIN SER CALC-MCNC: 2.8 G/DL (ref 1.9–3.5)
GLUCOSE BLD-MCNC: 100 MG/DL (ref 65–99)
GLUCOSE BLD-MCNC: 86 MG/DL (ref 65–99)
GLUCOSE SERPL-MCNC: 112 MG/DL (ref 65–99)
HCT VFR BLD AUTO: 32.5 % (ref 37–47)
HGB BLD-MCNC: 10.7 G/DL (ref 12–16)
IMM GRANULOCYTES # BLD AUTO: 0 K/UL (ref 0–0.11)
IMM GRANULOCYTES NFR BLD AUTO: 0 % (ref 0–0.9)
LYMPHOCYTES # BLD AUTO: 0.63 K/UL (ref 1–4.8)
LYMPHOCYTES NFR BLD: 17.4 % (ref 22–41)
MCH RBC QN AUTO: 31.2 PG (ref 27–33)
MCHC RBC AUTO-ENTMCNC: 32.9 G/DL (ref 33.6–35)
MCV RBC AUTO: 94.8 FL (ref 81.4–97.8)
MONOCYTES # BLD AUTO: 0.31 K/UL (ref 0–0.85)
MONOCYTES NFR BLD AUTO: 8.6 % (ref 0–13.4)
MORPHOLOGY BLD-IMP: NORMAL
NEUTROPHILS # BLD AUTO: 2.39 K/UL (ref 2–7.15)
NEUTROPHILS NFR BLD: 66 % (ref 44–72)
NRBC # BLD AUTO: 0 K/UL
NRBC BLD AUTO-RTO: 0 /100 WBC
PLATELET # BLD AUTO: 36 K/UL (ref 164–446)
PLATELET BLD QL SMEAR: NORMAL
PLATELETS.RETICULATED NFR BLD AUTO: 8.7 K/UL (ref 0.6–13.1)
PMV BLD AUTO: 12.9 FL (ref 9–12.9)
POTASSIUM SERPL-SCNC: 4.5 MMOL/L (ref 3.6–5.5)
PROT SERPL-MCNC: 6.4 G/DL (ref 6–8.2)
RBC # BLD AUTO: 3.43 M/UL (ref 4.2–5.4)
RBC BLD AUTO: NORMAL
SODIUM SERPL-SCNC: 133 MMOL/L (ref 135–145)
WBC # BLD AUTO: 3.6 K/UL (ref 4.8–10.8)

## 2017-05-18 PROCEDURE — A9270 NON-COVERED ITEM OR SERVICE: HCPCS | Performed by: HOSPITALIST

## 2017-05-18 PROCEDURE — 80053 COMPREHEN METABOLIC PANEL: CPT

## 2017-05-18 PROCEDURE — 82962 GLUCOSE BLOOD TEST: CPT | Mod: 91

## 2017-05-18 PROCEDURE — 700101 HCHG RX REV CODE 250: Performed by: INTERNAL MEDICINE

## 2017-05-18 PROCEDURE — 99239 HOSP IP/OBS DSCHRG MGMT >30: CPT | Performed by: FAMILY MEDICINE

## 2017-05-18 PROCEDURE — 85025 COMPLETE CBC W/AUTO DIFF WBC: CPT

## 2017-05-18 PROCEDURE — A9270 NON-COVERED ITEM OR SERVICE: HCPCS | Performed by: INTERNAL MEDICINE

## 2017-05-18 PROCEDURE — 700102 HCHG RX REV CODE 250 W/ 637 OVERRIDE(OP): Performed by: INTERNAL MEDICINE

## 2017-05-18 PROCEDURE — 85055 RETICULATED PLATELET ASSAY: CPT

## 2017-05-18 PROCEDURE — 36415 COLL VENOUS BLD VENIPUNCTURE: CPT

## 2017-05-18 PROCEDURE — 90935 HEMODIALYSIS ONE EVALUATION: CPT

## 2017-05-18 PROCEDURE — 85730 THROMBOPLASTIN TIME PARTIAL: CPT

## 2017-05-18 PROCEDURE — 700102 HCHG RX REV CODE 250 W/ 637 OVERRIDE(OP): Performed by: HOSPITALIST

## 2017-05-18 RX ADMIN — AMLODIPINE BESYLATE 5 MG: 5 TABLET ORAL at 07:44

## 2017-05-18 RX ADMIN — STANDARDIZED SENNA CONCENTRATE AND DOCUSATE SODIUM 2 TABLET: 8.6; 5 TABLET, FILM COATED ORAL at 07:44

## 2017-05-18 RX ADMIN — DEXTROSE MONOHYDRATE, SODIUM CITRATE, AND CITRIC ACID MONOHYDRATE: 2.45; 2.2; .8 INJECTION, SOLUTION INTRAVENOUS at 10:37

## 2017-05-18 RX ADMIN — LOSARTAN POTASSIUM 100 MG: 50 TABLET, FILM COATED ORAL at 07:44

## 2017-05-18 RX ADMIN — CALCITRIOL 0.25 MCG: 0.25 CAPSULE, LIQUID FILLED ORAL at 07:44

## 2017-05-18 ASSESSMENT — ENCOUNTER SYMPTOMS
PHOTOPHOBIA: 0
MUSCULOSKELETAL NEGATIVE: 1
MYALGIAS: 0
WEIGHT LOSS: 0
BLURRED VISION: 0
BACK PAIN: 0
WEAKNESS: 1
TREMORS: 0
GASTROINTESTINAL NEGATIVE: 1
ORTHOPNEA: 0
COUGH: 0
CHILLS: 0
PSYCHIATRIC NEGATIVE: 1
TINGLING: 0
NAUSEA: 0
PALPITATIONS: 0
SPUTUM PRODUCTION: 0
HEMOPTYSIS: 0
HEADACHES: 0
EYES NEGATIVE: 1
DOUBLE VISION: 0
RESPIRATORY NEGATIVE: 1
FEVER: 0
VOMITING: 0
HEARTBURN: 0
DIZZINESS: 0
CARDIOVASCULAR NEGATIVE: 1
NECK PAIN: 0

## 2017-05-18 ASSESSMENT — PAIN SCALES - GENERAL
PAINLEVEL_OUTOF10: 0

## 2017-05-18 NOTE — PROGRESS NOTES
Patient is assessed, safety protocol in place, patient calls appropriately. Using  phone and family, able to get patient down to dialysis after explaining procedure. Continue to monitor patient.

## 2017-05-18 NOTE — PROGRESS NOTES
[facility] -  PROGRESS NOTE               Author: Zari Lucas M.D. Date & Time: 5/18/2017  11:12 AM       HPI:  This is an 83-year-old  female with a past medical history significant for end-stage renal disease, on chronic hemodialysis for the past 2 years, hypertension, diabetes mellitus type 2,   anemia secondary to chronic kidney disease, renal osteodystrophy, depression, who was admitted with complaints of neck pain and having missed outpatient hemodialysis for the past 2 weeks.  Patient is primarily Zambian-speaking.  The history was obtained with the help of her family who is at bedside who were able to translate.  The patient recently moved from [place] to [place] to live with her daughter.  She has been tentatively accepted at outpatient dialysis at [facility]; however, they were unable to accept her and confirmed her chair because of the lack of a tuberculosis test results that they require.  Patient had last dialyzed about 1 week ago when she was living in [place], although this time line is somewhat uncertain as she was living with her brother in [place] and it is unclear whether she was dialyzed 1 week or a few days ago.  At any rate, she was unable to be dialyzed as an outpatient today when she presented to the [facility] because of lack of the tuberculosis test and so she was brought to the emergency room for further evaluation since she has missed her dialysis for a week as well as because of her neck pain.  Labs in the emergency room revealed an elevated potassium of 5.5 and an elevated BUN of 113.  Patient reports that she is feeling okay.  She denies any chest pain or shortness of breath.  No lightheadedness.  She is not having any lower extremity edema.  According to her family, patient has stopped many of her medications.  She is only taking losartan for her blood pressure as well as venlafaxine for depression.  She has  a history of diabetes mellitus type 2 that ultimately led to her end-stage renal disease, but according to her family, she is not taking any pills or insulin at this time.  She is also dialyzing via a right chest PermCath and according to the family, she has had a catheter in for about 2 years.  There have been no attempts to create a vascular access such as an AV fistula or an AV graft according to her family at bedside.  They would like to proceed with vascular access creation if possible.      DAILY NEPHROLOGY SUMMARY:  5/13/17 - No events, dialyzed yesterday and feels better today, no neck pain today, BP elevated this am, BUN improved after HD yesterday, only tolerated 300cc fluid removal with HD yesterday due to cramping  5/14/17 - No events, feels well, tolerated HD yesterday with 900cc UF, BP stable, platelets slight improved at 45K, WBC count slightly improved at 3.4  5/15/17 - NAEO, SOD, Qb300, 2L UF goal, complains of some cramping, UF on hold and had to terminate 30 min early  5/16/17 - NAEO, HD yest UF ~1L. No new labs, there was a mild improvement in WBC. Bone marrow biopsy done yest.  5/17/17 - NAEO, no complaints, refusing dialysis today, was crying on HD at last session due to cramping  5/18/17 - NAEO, SOD, Qb300, 1L UF goal; No complaints      Review of Systems   Constitutional: Positive for malaise/fatigue.   HENT: Negative.    Eyes: Negative.    Respiratory: Negative.    Cardiovascular: Negative.    Gastrointestinal: Negative.    Musculoskeletal: Negative.    Skin: Negative.    Neurological: Positive for weakness.   Endo/Heme/Allergies: Negative.    Psychiatric/Behavioral: Negative.    All other systems reviewed and are negative.        Physical Exam   Constitutional: She is oriented to person, place, and time. She appears cachectic. She is active. She appears ill.   HENT:   Head: Normocephalic and atraumatic.   Eyes: Conjunctivae are normal. No scleral icterus.   Neck: Neck supple. No tracheal  deviation present.   Cardiovascular: Normal rate and regular rhythm.    Murmur heard.  Pulmonary/Chest: Effort normal and breath sounds normal.   Abdominal: Soft. Bowel sounds are normal.   Musculoskeletal: She exhibits no edema or tenderness.   Neurological: She is alert and oriented to person, place, and time. She exhibits normal muscle tone.   Skin: Skin is warm and dry.   Psychiatric: She has a normal mood and affect. Her behavior is normal.   Nursing note and vitals reviewed.        PAST FAMILY HISTORY: Reviewed and Unchanged  SOCIAL HISTORY: Reviewed and Unchanged  CURRENT MEDICATIONS: Reviewed  LABORATORY RESULTS: Reviewed  IMAGING STUDIES: Reviewed      Fluids:         IMPRESSION:  - ESRD    * Etiology likely 2/2 HTN/DM  - Pancytopenia    * Etiology unclear, concern for MDS  - Hyperkalemia  - HTN  - type 2 DM  - Anemia of CKD  - Renal Osteodystrophy  - Mild-moderate PCM    ASSESSMENT:  - GFR: HD dependent  - Urine: oligoanuric  - BP: Goal < 150/90  - Volume: euvolemic  - Acid/Base: no sig. acid base disturbance  - Electrolytes: stable  - Anemia: Goal Hgb 10-11  - MBD: Ca normal, PO4 elevated, PTH elevated, Vit D low  - HD Access: [facility] PermCat    PLAN:  - MWF iHD  - UF as tolerated  - No heparin, ACD in catheter port for locking  - Due to her fraility pursuing permanent access with AVF/AVG needs to be re-evaluated with the family  - [facility] set up for 5/19/17 start date, ok to transition to outpatient care from our standpoint, will continue to dialyze in-house until she's D/C  - Dose all meds per ESRD guidelines

## 2017-05-18 NOTE — PROGRESS NOTES
Hospital Medicine Progress Note, Adult, Complex               Author: Malvin Shaffer Date & Time created: 5/18/2017  11:22 AM     Interval History:  83YR OLD F WITH PANCYTOPENIA AND ESRD,NEEDS ACCEPTANCE FOR  DIALYSIS BY OUTSIDE FACILITY    Review of Systems:  Review of Systems   Constitutional: Negative for fever, chills and weight loss.   HENT: Negative for hearing loss and tinnitus.    Eyes: Negative for blurred vision, double vision and photophobia.   Respiratory: Negative for cough, hemoptysis and sputum production.    Cardiovascular: Negative for chest pain, palpitations and orthopnea.   Gastrointestinal: Negative for heartburn, nausea and vomiting.   Genitourinary: Negative for dysuria, urgency and frequency.   Musculoskeletal: Negative for myalgias, back pain and neck pain.   Skin: Negative for itching.   Neurological: Negative for dizziness, tingling, tremors and headaches.       Physical Exam:  Physical Exam   Constitutional: No distress.   HENT:   Head: Normocephalic and atraumatic.   Eyes: Right eye exhibits no discharge. Left eye exhibits no discharge.   Neck: Neck supple. No JVD present.   Cardiovascular: Normal rate.    Murmur heard.  Pulmonary/Chest: No stridor. No respiratory distress. She has no wheezes. She has no rales.   Abdominal: Soft. She exhibits no distension. There is no tenderness. There is no rebound.   Musculoskeletal: She exhibits no tenderness.   Neurological: She is alert.   Skin: Skin is warm and dry. She is not diaphoretic.       Labs:        Invalid input(s): [id number]      Recent Labs      05/17/17 0237  05/18/17   0304   SODIUM  134*  133*   POTASSIUM  4.3  4.5   CHLORIDE  98  98   CO2  24  22   BUN  51*  74*   CREATININE  5.19*  5.93*   CALCIUM  9.0  9.0     Recent Labs      05/17/17 0237  05/18/17   0304   ALTSGPT   --   16   ASTSGOT   --   20   ALKPHOSPHAT   --   97   TBILIRUBIN   --   0.6   GLUCOSE  130*  112*     Recent Labs      05/17/17 0237 05/17/17    1253  17   0304   RBC  3.53*   --   3.43*   HEMOGLOBIN  10.8*   --   10.7*   HEMATOCRIT  33.4*   --   32.5*   PLATELETCT  38*   --   36*   PROTHROMBTM   --   15.4*   --    APTT   --    --   34.7   INR   --   1.18*   --    IRON   --   44   --    FERRITIN   --   470.4*   --    TOTIRONBC   --   238*   --      Recent Labs      17   0237  17   0304   WBC  4.3*  3.6*   NEUTSPOLYS  66.60  66.00   LYMPHOCYTES  15.90*  17.40*   MONOCYTES  10.60  8.60   EOSINOPHILS  6.50  7.70*   BASOPHILS  0.20  0.30   ASTSGOT   --   20   ALTSGPT   --   16   ALKPHOSPHAT   --   97   TBILIRUBIN   --   0.6           Hemodynamics:  Temp (24hrs), Av.8 °C (98.2 °F), Min:36.4 °C (97.6 °F), Max:36.9 °C (98.5 °F)  Temperature: 36.9 °C (98.5 °F)  Pulse  Av.3  Min: 58  Max: 135   Blood Pressure : 147/83 mmHg     Respiratory:    Respiration: 17, Pulse Oximetry: 98 %        RUL Breath Sounds: Clear, RML Breath Sounds: Diminished, RLL Breath Sounds: Diminished, KATHARINE Breath Sounds: Clear, LLL Breath Sounds: Diminished  Fluids:    Intake/Output Summary (Last 24 hours) at 17 1122  Last data filed at 17 0400   Gross per 24 hour   Intake    740 ml   Output      0 ml   Net    740 ml     Weight: 69.5 kg (153 lb 3.5 oz)  GI/Nutrition:  Orders Placed This Encounter   Procedures   • DIET ORDER     Standing Status: Standing      Number of Occurrences: 1      Standing Expiration Date:      Order Specific Question:  Diet:     Answer:  Renal [8]     Medical Decision Making, by Problem:  Active Hospital Problems    Diagnosis   • HTN (hypertension) [I10]   • ESRD on hemodialysis (CMS-HCC) [N18.6, Z99.2]   • Pancytopenia (CMS-HCC) [D61.818]   83YR OLD F WITH  ESRD  NEPHROLOGY INPUT IS NOTED  ON DIALYSIS  ONCE A DIALYSIS CENTER ACCEPTS HER IN THE OUTPT  SETTING THEN SHE WILL BE DISCHARGED  D/W THE STAFF DURING THE ROUNDS        PANCYTOPENIA  PLATELETS ARE A BIT LOWER THEN YESTERDAY  ONCOLOGY INPUT IS NOTED  S/P BONE MARROW BIOPSY  MOST  LIKELY 2ND TO LIVER CIRRHOSIS    HTN  AMLODIPINE  LOSARTAN  CONTROLLED    DISPOSITION  PT AND OT      Martino catheter: No Martino      DVT Prophylaxis: Contraindicated - High bleeding risk

## 2017-05-18 NOTE — DISCHARGE INSTRUCTIONS
Discharge Instructions    Discharged to home by car with relative. Discharged via wheelchair, hospital escort: Yes.  Special equipment needed: Not Applicable    Be sure to schedule a follow-up appointment with your primary care doctor or any specialists as instructed.     Discharge Plan:   Influenza Vaccine Indication: Indicated: Adults > or equal to 18 years of age with severe allergy to eggs (Flublok vaccine)    I understand that a diet low in cholesterol, fat, and sodium is recommended for good health. Unless I have been given specific instructions below for another diet, I accept this instruction as my diet prescription.   Other diet: As tolerated    Special Instructions: None    · Is patient discharged on Warfarin / Coumadin?   No     · Is patient Post Blood Transfusion?  No    Depression / Suicide Risk    As you are discharged from this [facility] facility, it is important to learn how to keep safe from harming yourself.    Recognize the warning signs:  · Abrupt changes in personality, positive or negative- including increase in energy   · Giving away possessions  · Change in eating patterns- significant weight changes-  positive or negative  · Change in sleeping patterns- unable to sleep or sleeping all the time   · Unwillingness or inability to communicate  · Depression  · Unusual sadness, discouragement and loneliness  · Talk of wanting to die  · Neglect of personal appearance   · Rebelliousness- reckless behavior  · Withdrawal from people/activities they love  · Confusion- inability to concentrate     If you or a loved one observes any of these behaviors or has concerns about self-harm, here's what you can do:  · Talk about it- your feelings and reasons for harming yourself  · Remove any means that you might use to hurt yourself (examples: pills, rope, extension cords, firearm)  · Get professional help from the community (Mental Health, Substance Abuse, psychological counseling)  · Do not be alone:Call  your Safe Contact- someone whom you trust who will be there for you.  · Call your local CRISIS HOTLINE 146-7654 or 608-158-2452  · Call your local Children's Mobile Crisis Response Team [place] (554) 177-0300 or www.Positionly  · Call the toll free National Suicide Prevention Hotlines   · National Suicide Prevention Lifeline 393-358-AJWC (1580)  · [place] Hope Line Network 800-SUICIDE (013-4906)    Hemodialysis  Dialysis is a procedure that replaces some of the work that healthy kidneys do. It is done when you lose about 85-90% of your kidney function and sometimes earlier if your symptoms may be improved by dialysis. During dialysis, wastes, salt, and extra water are removed from the blood, and the level of certain chemicals in the blood (such as potassium) is maintained. Hemodialysis is a type of dialysis in which a machine called a dialyzer is used to filter the blood. Hemodialysis is usually performed by a health care provider at a hospital or dialysis center 3 times a week for 3-5 hours during each visit. It may also be performed more frequently and for longer periods of time at home with training.  LET YOUR HEALTH CARE PROVIDER KNOW ABOUT:  · Any allergies you have.  · All medicines you are taking, including vitamins, herbs, eye drops, creams, and over-the-counter medicines.  · Any blood disorders you have.  · Other health problems you have.  RISKS AND COMPLICATIONS  Generally, hemodialysis is a safe procedure. However, problems can occur and include:  · Low blood pressure (hypotension).  · Narrowing or ballooning of a blood vessel or bleeding at the site where blood is removed from the body and returned to the body (vascular access).  · An infection or blockage at the vascular access site.  BEFORE THE PROCEDURE  Before having hemodialysis for the first time, you will need surgery to create a site where blood can be removed from the body and returned to the body (vascular access). There are three  types of vascular accesses:  · Arteriovenous fistula. This type of access is created when an artery and a vein (usually in the arm) are connected during surgery. The arteriovenous fistula takes 1-6 months to develop after surgery.  · Arteriovenous graft. This type of access is created when an artery and a vein in the arm are connected during surgery with a tube. An arteriovenous graft can be used within 2-3 weeks of surgery.  · A venous catheter. To create this type of access, a thin, flexible tube (catheter) is placed in a large vein in your neck, chest, or groin. A venous catheter can be used right away.  PROCEDURE   Hemodialysis is performed while you are sitting or reclining. During the procedure, you may sleep, read, watch television, or perform any other tasks that can be done while you are in this position. If you experience side effects or any other discomfort during the procedure, let your health care provider know. He or she may be able to make adjustments to help you feel more comfortable.  Your hemodialysis process may look something like this:  2. Your weight, blood pressure, pulse, and temperature will be measured.  3. The skin around your vascular access will be sterilized.  4. Your vascular access will be connected to the dialyzer. If your access is a fistula or graft, two needles will be inserted through the vascular access. The needles will be connected to a plastic tube that is connected to the dialyzer. They will be taped to your skin so that they do not move out of place. If your access is a catheter, it will be connected to a plastic tube that is connected to the dialyzer.  ¨ The dialysis machine will be turned on. This will cause your blood to flow to the dialyzer. The dialyzer will filter and clean your blood before returning it to your body. While the dialysis machine is working, your blood pressure and pulse will be checked several times.  5. Once dialysis is complete, your vascular access  will be disconnected from the dialyzer. If your access is a fistula or graft, the needles will be removed and a bandage (dressing) will be placed over the access.  AFTER THE PROCEDURE  · Your weight will be measured.  · Your blood may be tested to see whether your treatments are removing enough wastes. This is usually done once a month.  · You may experience or continue to experience side effects, including:  ¨ Dizziness.  ¨ Muscle cramps.  ¨ Nausea.  ¨ Headaches.  ¨ Feeling tired. Energy levels usually return to normal the day after the procedure.  ¨ Itchiness. Your health care provider may be able to prescribe medicine to relieve it.  ¨ Achy or jittery legs. You may feel like kicking your legs. This sometimes causes sleeping problems.  ¨ Allergic reaction to the chemicals used to sterilize the dialyzer.     This information is not intended to replace advice given to you by your health care provider. Make sure you discuss any questions you have with your health care provider.     Document Released: 04/14/2014 Document Revised: 01/08/2016 Document Reviewed: 04/14/2014  [employer] Interactive Patient Education ©2016 [employer].      Hemodiálisis  (Hemodialysis)  La diálisis es un procedimiento que reemplaza parte de la función que realizan los riñones sanos. Se realiza cuando se pierde alrededor del 85 % al 90 % de la función renal, y a veces antes si los síntomas pueden mejorarse con diálisis. Kaia la diálisis, se eliminan los desechos, las sales y el exceso de agua de la lu, y se mantiene el nivel de ciertas sustancias químicas en la lu (alma el potasio). La hemodiálisis es un tipo de diálisis en la que se utiliza bhumika máquina llamada dializador para filtrar la lu. Por lo general, un médico realiza la hemodiálisis en un hospital o un centro de diálisis 3 veces a la semana, y cada sesión dura de 3 a 5 horas. Si se recibe bhumika capacitación, también puede hacerse con más frecuencia y kaia períodos más  prolongados en el hogar.  INFORME A LISA MÉDICO:  · Cualquier alergia que tenga.  · Todos los medicamentos que utiliza, incluidos vitaminas, hierbas, gotas oftálmicas, cremas y medicamentos de venta karla.  · Enfermedades de la lu que tenga.  · Otros problemas de miguelina.  RIESGOS Y COMPLICACIONES  En general, la hemodiálisis es un procedimiento seguro. Sin embargo, pueden ocurrir algunos problemas, alma:  · Presión arterial baja (hipotensión).  · Estrechamiento o hinchazón de un vaso sanguíneo, o sangrado en el lugar donde la lu sale del cuerpo y vuelve a ingresar en él (acceso vascular).  · Infección u obstrucción en el lugar del acceso vascular.  ANTES DEL PROCEDIMIENTO  Antes de la primera sesión de hemodiálisis, le harán bhumika cirugía para crear un lugar en el que la lu pueda extraerse del cuerpo y volver a ingresar en él (acceso vascular). Hay gume tipos de accesos vasculares:  · Fístula arteriovenosa. Para crear amchelle tipo de acceso, se conectan bhumika arteria y bhumika vena (por lo general, del brazo) kaia bhumika cirugía. La fístula arteriovenosa demora de 1 a 6 meses en formarse después de la cirugía.  · Injerto arteriovenoso. Para crear machelle tipo de acceso, se conectan bhumika arteria y bhumika vena del brazo con un tubo kaia bhumika cirugía. El injerto arteriovenoso puede usarse en el término de 2 a 3 semanas después de la cirugía.  · Catéter venoso. Para crear machelle tipo de acceso, se coloca un tubo brito y flexible (catéter) en bhumika vena radha del jose rafael, el tórax o la emili. El catéter venoso puede usarse inmediatamente.  PROCEDIMIENTO   Para la sesión de hemodiálisis, debe estar sentado o reclinado. Kaia machelle procedimiento, puede dormir, leer, mirar televisión o realizar cualquier otra tarea que pueda hacerse en esta posición. Si tiene algún efecto secundario o cualquier otra molestia kaia el procedimiento, avísele al médico. Machelle podrá hacer ajustes que lo ayuden a sentirse más cómodo.  El proceso de  hemodiálisis consistirá más o menos en lo siguiente:  6. Lo pesarán, le tomarán la presión arterial y la temperatura, y le medirán el pulso.  7. Le esterilizarán la piel de alrededor del acceso vascular.  8. El acceso vascular se conectará al dializador. Si el acceso es bhumika fístula o un injerto, se introducirán dos agujas a través del acceso vascular. Las agujas se conectarán a un tubo plástico que está conectado con el dializador y se pegarán con bhumika cinta sobre la piel para que no se muevan. Si el acceso es un catéter, machelle se conectará a un tubo plástico que está conectado con el dializador.  ¨ La máquina de diálisis se encenderá. De esta manera, la lu fluirá hacia el dializador, que la filtrará y limpiará antes de que vuelva a ingresar al cuerpo. Mientras la máquina de diálisis esté en funcionamiento, le controlarán la presión arterial y el pulso varias veces.  9. Cuando la diálisis termine, el acceso vascular se desconectará del dializador. Si el acceso es bhumika fístula o un injerto, las agujas se sacarán y se colocará bhumika venda (apósito) sobre el acceso.  DESPUÉS DEL PROCEDIMIENTO  · Lo pesarán.  · Pueden hacerle un análisis de lu para merlene si el tratamiento está eliminando la cantidad suficiente de desechos. Por lo general, esto se hace bhumika vez al mes.  · Puede tener los siguientes efectos secundarios:  ¨ Mareos.  ¨ Calambres musculares.  ¨ Náuseas.  ¨ Vania de lee.  ¨ Cansancio. Los niveles de energía por lo general se normalizan el día después del procedimiento.  ¨ Picazón. El médico puede recetar medicamentos para calmarla.  ¨ Dolor o inquietud en las piernas. Puede sentir que las piernas patean. Lund a veces causa problemas para dormir.  ¨ Reacción alérgica a las sustancias químicas utilizadas para esterilizar el dializador.     Esta información no tiene alma fin reemplazar el consejo del médico. Asegúrese de hacerle al médico cualquier pregunta que tenga.     Document Released: 04/14/2014  Document Revised: 01/08/2016  [employer] Interactive Patient Education ©2016 Elsevier Inc.

## 2017-05-18 NOTE — PROGRESS NOTES
HEM/ONC follow up note :     - No overnight events  - No change since yesterday   - Here to review labs from yesterday    HGB stable 10.7   ARC 0.02 low  LDH N, Bili N,   Iron 44 N, TIBC 238 low  % sat N   Ferritin 470   EPO level pending     A/P:   - Cytopenias mainly thrombocytopenia and leukopenia .  No B symptoms.  Normal B12, folic acid, TSH, and HIV.  U/S on  5/15/17 suggestive of cirrhosis /splenomegaly and portal HTN.  BMBX on 5/15/17 showed occasional megakaryocytes, N morphology. No dysplasia, no leukemia. Flow N. Cytogenetics pending. Peripheral showed platelet clumping. Her WBC normalized. PLT count stable 30,000 range.     2.  ESRD on HD  3.  HTN, poorly controlled  4.  Diabetes mellitus.  5. Normocytic anemia : multifactorial likely in this case Epo deficiency . Iron studies WNL. No evidence of hemolysis. LDH and Bili N. She has reticulocytopenia.     Plan:   I reviewed labs. No evidence of hemolysis or bleeding. N Iron studies.   Since her HGB Above 10.0 . No intervention needed.   As mentioned before supportive measures with transfusions if counts drop. Stable PLT between 35-45      Will officially sign off. Call if any questions arise. [phone]

## 2017-05-18 NOTE — CARE PLAN
Problem: Safety  Goal: Will remain free from injury  Bed locked in lowest position, call light within reach, bed locked in lowest position, bed alarm on, continue to monitor.    Problem: Infection  Goal: Will remain free from infection  Monitor for s/sx of infection

## 2017-05-18 NOTE — DISCHARGE PLANNING
Pt has been accepted to [facility] for MWF 1515.  First OP tx will be tomorrow, Friday 5/19/17 with a check-in time of 1445.  Spoke to DARIO Watkins to notify.

## 2017-05-18 NOTE — PROGRESS NOTES
Hemodialysis for 2 hours done today, ended up with +104 ml, unable to remove fluids due to increase and irregular heart rate 70's to 130's BPM, patient asymptomatic, Dr lou notified with order to cont. HD, report given to JERSEY Shankar, see flow sheet for details

## 2017-05-18 NOTE — CARE PLAN
Problem: Safety  Goal: Will remain free from falls  Bed in low and locked position, non-slip socks on patient, hourly rounding in place, family at bedside.     Problem: Venous Thromboembolism (VTW)/Deep Vein Thrombosis (DVT) Prevention:  Goal: Patient will participate in Venous Thrombosis (VTE)/Deep Vein Thrombosis (DVT)Prevention Measures  Pt ambulates on occasion, encouraged to perform range of motion when in bed and awake.

## 2017-05-18 NOTE — PROGRESS NOTES
Carlotta from Lab called with critical result of platelets at 0530. Critical lab result read back to Carlotta.   HUMA Pichardo notified of critical lab result at 0542.  Critical lab result read back by HUMA Pichardo.

## 2017-05-18 NOTE — PROGRESS NOTES
Discharge instructions given to family and family translated, attempted  phone but patient did not understand. Patient was given belongings, discontinued IV, continue to monitor patient. [facility] Continue to monitor. Given diet and doctor orders.

## 2017-05-18 NOTE — PROGRESS NOTES
Received report at bedside and assumed care of patient at 1900. Pt resting comfortably in bed at this time. AxOx4, bed in low and locked position, call light within reach and used appropriately, non-slip socks on patient, hourly rounding in place. Pt in no signs of distress at this time.     Pt refusing bed alarm at this time. Family at bedside at all times and calling when patient in need of assistance.

## 2017-05-19 NOTE — DISCHARGE SUMMARY
DATE OF ADMISSION:  05/12/2017    DATE OF DISCHARGE:  05/18/2017    HISTORY OF PRESENT ILLNESS:  This is an 83-year-old female who comes in with   the need for dialysis.  Patient has a history of end-stage renal disease, has   been on dialysis for the past 2 years on Tuesday, Thursday and Saturdays.  She   recently had been moved from [facility] to this area and was not able   to get a place for dialysis, admitted to the hospital for further management.    HOSPITAL PRESENTATION:  During the hospital stay, patient had a nephrology   consultation and routine dialysis.  Also, was noted to have pancytopenia.    Hematology/oncology consultation was placed.  The patient was seen by her   oncologist.  The oncologist thought that the pancytopenia is secondary to   cirrhosis of the liver and it was multifactorial with Epogen deficiency and   iron studies showing that they were within normal limits and also no evidence   of hemolysis.  No other intervention was recommended.  The patient also had   QuantiFERON-TB test done that was negative.  Also HIV was nonreactive.  _____   was noticed.  Bone marrow biopsy was done on this patient and at this time, I   do not have the results.  At this time, patient is in stable condition and   will be discharged home.  Patient needs to have a CBC on 05/19/2017.    PHYSICAL EXAMINATION:  VITAL SIGNS:  Temperature of 36.9, pulse of 84, respiratory rate of 17, blood   pressure 147/83, O2 saturation 98% on room air.  GENERAL APPEARANCE:  Patient is awake, alert and in no acute distress.  HEENT:  Neck is supple.  Lips are moist.  No jugular venous distention noted.  CARDIOVASCULAR:  S1 and S2, regular.  III/VI systolic murmur noted.  LUNGS:  Decreased breath sounds, otherwise clear.  ABDOMEN:  Soft, nontender.  EXTREMITIES:  Lower extremities, no edema or rash noted.    LABORATORY DATA:  WBC of 3.6, H and H of 10.7 and 32.5, platelets of 36,000.    Sodium is 133, potassium of 4.5,  chloride of 90, bicarbonate of 22, BUN of 74,   creatinine of 5.93.    ASSESSMENT AND PLAN:  This is an 83-year-old female with end-stage renal   disease.  1.  At baseline.  2.  Dialysis, on 3 times a week.  3.  Patient has a dialysis center that has accepted the patient.  4.  Dialysis would be on Friday.  Patient is to have dialysis on Monday,   Wednesday and Fridays.  5.  Pancytopenia, at this time is baseline.  No intervention is recommended by   hematology/oncology.  6.  We will check a CBC in 05/19/2017.  7.  For hypertension, we will continue only with losartan 100 mg p.o. daily.    I spent 45 minutes.  Patient needs to follow up in 1 week.       ____________________________________     Malvin Shaffer MD    [facility]    DD:  05/18/2017 14:53:02  DT:  05/18/2017 15:27:50    D#:  [id number]  Job#:  [id number]

## 2017-05-25 ENCOUNTER — OFFICE VISIT (OUTPATIENT)
Dept: MEDICAL GROUP | Facility: MEDICAL CENTER | Age: 82
End: 2017-05-25
Payer: MEDICARE

## 2017-05-25 VITALS
HEART RATE: 84 BPM | RESPIRATION RATE: 14 BRPM | DIASTOLIC BLOOD PRESSURE: 78 MMHG | HEIGHT: 57 IN | WEIGHT: 113.6 LBS | TEMPERATURE: 99.9 F | OXYGEN SATURATION: 96 % | BODY MASS INDEX: 24.51 KG/M2 | SYSTOLIC BLOOD PRESSURE: 122 MMHG

## 2017-05-25 DIAGNOSIS — Z76.89 ENCOUNTER TO ESTABLISH CARE: ICD-10-CM

## 2017-05-25 DIAGNOSIS — N18.6 ESRD ON HEMODIALYSIS (HCC): ICD-10-CM

## 2017-05-25 DIAGNOSIS — I15.9 SECONDARY HYPERTENSION: ICD-10-CM

## 2017-05-25 DIAGNOSIS — D61.818 PANCYTOPENIA (HCC): ICD-10-CM

## 2017-05-25 DIAGNOSIS — K74.60 CIRRHOSIS OF LIVER WITHOUT ASCITES, UNSPECIFIED HEPATIC CIRRHOSIS TYPE (HCC): ICD-10-CM

## 2017-05-25 DIAGNOSIS — Z09 HOSPITAL DISCHARGE FOLLOW-UP: ICD-10-CM

## 2017-05-25 DIAGNOSIS — R73.03 PREDIABETES: ICD-10-CM

## 2017-05-25 DIAGNOSIS — R01.1 HEART MURMUR, SYSTOLIC: ICD-10-CM

## 2017-05-25 DIAGNOSIS — F33.0 MILD EPISODE OF RECURRENT MAJOR DEPRESSIVE DISORDER (HCC): ICD-10-CM

## 2017-05-25 DIAGNOSIS — Z99.2 ESRD ON HEMODIALYSIS (HCC): ICD-10-CM

## 2017-05-25 PROCEDURE — 99205 OFFICE O/P NEW HI 60 MIN: CPT | Performed by: PHYSICIAN ASSISTANT

## 2017-05-25 RX ORDER — VIT B COMP NO.3/FOLIC/C/BIOTIN 1 MG-60 MG
TABLET ORAL
COMMUNITY
End: 2017-05-25

## 2017-05-25 RX ORDER — CIPROFLOXACIN 500 MG/1
500 TABLET, FILM COATED ORAL 2 TIMES DAILY
COMMUNITY
End: 2017-05-25

## 2017-05-25 RX ORDER — CARVEDILOL 12.5 MG/1
12.5 TABLET ORAL 2 TIMES DAILY WITH MEALS
COMMUNITY
End: 2017-05-25

## 2017-05-25 RX ORDER — CARVEDILOL 6.25 MG/1
6.25 TABLET ORAL 2 TIMES DAILY WITH MEALS
COMMUNITY
End: 2017-05-25

## 2017-05-25 RX ORDER — LOSARTAN POTASSIUM 100 MG/1
100 TABLET ORAL DAILY
Qty: 90 TAB | Refills: 1 | Status: SHIPPED | OUTPATIENT
Start: 2017-05-25

## 2017-05-25 RX ORDER — METRONIDAZOLE 500 MG/1
500 TABLET ORAL 3 TIMES DAILY
COMMUNITY
End: 2017-05-25

## 2017-05-25 RX ORDER — HYDRALAZINE HYDROCHLORIDE 50 MG/1
50 TABLET, FILM COATED ORAL 3 TIMES DAILY
COMMUNITY
End: 2017-05-25

## 2017-05-25 RX ORDER — CLONIDINE HYDROCHLORIDE 0.2 MG/1
0.2 TABLET ORAL 2 TIMES DAILY
COMMUNITY
End: 2017-05-25

## 2017-05-25 ASSESSMENT — PAIN SCALES - GENERAL: PAINLEVEL: NO PAIN

## 2017-05-25 ASSESSMENT — PATIENT HEALTH QUESTIONNAIRE - PHQ9: CLINICAL INTERPRETATION OF PHQ2 SCORE: 1

## 2017-05-25 NOTE — PROGRESS NOTES
Subjective:   Marcie Mccartney is a 83 y.o. female here today for possible information follow-up and to establish care with several chronic medical conditions.    ESRD on hemodialysis (CMS-[facility])  This is an 83-year-old female In by her granddaughter and great-grandson. She is here today to establish care. She recently moved here from [place] in [place]. She was recently hospitalized for a few days from May 12 to May 15. She has several chronic medical conditions. First being kidney disease. She is an stage renal disease. She is on dialysis 3 times a week now at [facility]. Recent laboratory showed the following:       Ref. Range 5/18/2017 03:04   Sodium Latest Ref Range: 135-145 mmol/L 133 (L)   Potassium Latest Ref Range: 3.6-5.5 mmol/L 4.5   Chloride Latest Ref Range:  mmol/L 98   Co2 Latest Ref Range: 20-33 mmol/L 22   Anion Gap Latest Ref Range: 0.0-11.9  13.0 (H)   Glucose Latest Ref Range: 65-99 mg/dL 112 (H)   Bun Latest Ref Range: 8-22 mg/dL 74 (HH)   Creatinine Latest Ref Range: 0.50-1.40 mg/dL 5.93 (HH)   GFR If  Latest Ref Range: >60 mL/min/1.73 m 2 8 (A)   GFR If Non  Latest Ref Range: >60 mL/min/1.73 m 2 7 (A)   Calcium Latest Ref Range: 8.5-10.5 mg/dL 9.0   AST(SGOT) Latest Ref Range: 12-45 U/L 20   ALT(SGPT) Latest Ref Range: 2-50 U/L 16   Alkaline Phosphatase Latest Ref Range: 30-99 U/L 97   Total Bilirubin Latest Ref Range: 0.1-1.5 mg/dL 0.6   Albumin Latest Ref Range: 3.2-4.9 g/dL 3.6   Total Protein Latest Ref Range: 6.0-8.2 g/dL 6.4   Globulin Latest Ref Range: 1.9-3.5 g/dL 2.8   A-G Ratio Latest Units: g/dL 1.3     She is not following up with a nephrologist.    Heart murmur, systolic  Granddaughter states there was concerned about a murmur. She has a history of hypertension. Echocardiogram showed the following:    CONCLUSIONS  No prior study is available for comparison.   Normal left ventricular systolic function.  Left ventricular ejection fraction is visually  estimated to be 65%.  Moderate concentric left ventricular hypertrophy.  Moderate aortic stenosis.  Mild aortic insufficiency.  Mild mitral stenosis.  Right heart pressures are consistent with moderate pulmonary   Hypertension.    They request a referral to see a cardiologist to follow-up in the heart murmur as well as to follow-up on her history of hypertension.    HTN (hypertension)  Her granddaughter she has a history of hypertension. She has several bottles of hypertensive medications but currently is only taking one losartan 100 mg daily. That bottle has no medication and it was written in December. During her hospitalization she was given losartan and Norvasc once during hospitalization for elevated BP.    Mild episode of recurrent major depressive disorder (CMS-HCC)  She has a history of depression. Takes Effexor 37.5 mg at bedtime. Granddaughter states that her mother told her that Ketty woke up the other day and she was crying. There appears to be a concern that she sleeps from 10-3 or 4 daily. She also takes naps during the daytime.    Pancytopenia (CMS-HCC)  During hospitalization she was diagnosed with pancytopenia. Labs showed the following:       Ref. Range 5/18/2017 03:04   WBC Latest Ref Range: 4.8-10.8 K/uL 3.6 (L)   RBC Latest Ref Range: 4.20-5.40 M/uL 3.43 (L)   Hemoglobin Latest Ref Range: 12.0-16.0 g/dL 10.7 (L)   Hematocrit Latest Ref Range: 37.0-47.0 % 32.5 (L)   MCV Latest Ref Range: 81.4-97.8 fL 94.8   MCH Latest Ref Range: 27.0-33.0 pg 31.2   MCHC Latest Ref Range: 33.6-35.0 g/dL 32.9 (L)   RDW Latest Ref Range: 35.9-50.0 fL 47.2   Platelet Count Latest Ref Range: 164-446 K/uL 36 (LL)   MPV Latest Ref Range: 9.0-12.9 fL 12.9     Total iron was 44.    Cirrhosis of liver without ascites (CMS-HCC)  During her evaluation at the hospital ultrasound was performed that showed the following:    Impression        1.  Heterogeneous nodular appearing liver consistent with cirrhosis.    2.  Mild  "splenomegaly and splenic varices are noted consistent with portal hypertension.    3.  Increased renal echogenicity consistent with medical renal disease.    4.  Several cysts are noted in each kidney.     Hepatitis panel was pulled and it was negative.          Current medicines (including changes today)  Current Outpatient Prescriptions   Medication Sig Dispense Refill   • losartan (COZAAR) 100 MG Tab Take 1 Tab by mouth every day. 90 Tab 1   • venlafaxine (EFFEXOR) 37.5 MG Tab Take 37.5 mg by mouth every bedtime.       No current facility-administered medications for this visit.     She  has no past medical history on file.    ROS   No chest pain, no shortness of breath, no abdominal pain and all other systems were reviewed and are negative.       Objective:     Blood pressure 122/78, pulse 84, temperature 37.7 °C (99.9 °F), resp. rate 14, height 1.448 m (4' 9.01\"), weight 51.529 kg (113 lb 9.6 oz), SpO2 96 %. Body mass index is 24.58 kg/(m^2).   Physical Exam:  Constitutional: Alert, no distress. Dozing off at times during the appointment. Easily aroused to to smile.  Skin: Warm, dry, good turgor, no rashes in visible areas.  Eye: Equal, round and reactive, conjunctiva clear, lids normal.  ENMT: Lips without lesions, good dentition, oropharynx clear.  Neck: Trachea midline, no masses.   Lymph: No cervical or supraclavicular lymphadenopathy  Respiratory: Unlabored respiratory effort, lungs clear to auscultation, no wheezes, no ronchi.  Cardiovascular: Normal S1, S2, murmur, no edema.  Abdomen: Soft, non-tender, no masses.  Psych: Alert and oriented x3, normal affect and mood.        Assessment and Plan:   The following treatment plan was discussed    1. ESRD on hemodialysis (CMS-HCC)  Chronic condition. Continue to follow up with hemodialysis 3 times weekly. Referred to nephrology to establish care.  - REFERRAL TO NEPHROLOGY    2. Heart murmur, systolic  Likely chronic condition. Refer to cardiology to establish " care.  - REFERRAL TO CARDIOLOGY    3. Prediabetes  She is not diabetic currently as hemoglobin A1c at 6.3. No medication offered.    4. Cirrhosis of liver without ascites, unspecified hepatic cirrhosis type (CMS-HCC)  Unclear etiology. Found on ultrasound. Refer to gastroenterology. Appetite is panel negative.  - REFERRAL TO GASTROENTEROLOGY    5. Pancytopenia (CMS-HCC)  Unclear etiology. Likely secondary to end-stage renal disease. Will monitor. Will address in follow-up.    6. Secondary hypertension  Chronic condition and controlled. Renewed losartan 100 mg daily. Refer to cardiology to discuss hypertension.    7. Mild episode of recurrent major depressive disorder (CMS-HCC)  Chronic condition. Continue venlafaxine as directed. No sleep aid medication given. Advised patient may be adjusting to new area. Maybe consider over-the-counter Benadryl if sleep does become a problem. She does sleep 5-6 hours nightly and takes naps during the daytime. She has minimal activity.    8. Hospital discharge follow-up  Stable    9. Encounter to establish care    Total 40 minutes face-to-face time spent with patient, with greater than 50% of the total time discussing patient's issues and symptoms as listed above in assessment and plan, as well as managing coordination of care for future evaluation and treatment.      Followup: Return if symptoms worsen or fail to improve.    Please note that this dictation was created using voice recognition software. I have made every reasonable attempt to correct obvious errors, but I expect that there are errors of grammar and possibly content that I did not discover before finalizing the note.

## 2017-05-25 NOTE — ASSESSMENT & PLAN NOTE
Granddaughter states there was concerned about a murmur. She has a history of hypertension. Echocardiogram showed the following:    CONCLUSIONS  No prior study is available for comparison.   Normal left ventricular systolic function.  Left ventricular ejection fraction is visually estimated to be 65%.  Moderate concentric left ventricular hypertrophy.  Moderate aortic stenosis.  Mild aortic insufficiency.  Mild mitral stenosis.  Right heart pressures are consistent with moderate pulmonary   Hypertension.    They request a referral to see a cardiologist to follow-up in the heart murmur as well as to follow-up on her history of hypertension.

## 2017-05-25 NOTE — ASSESSMENT & PLAN NOTE
During her evaluation at the hospital ultrasound was performed that showed the following:    Impression        1.  Heterogeneous nodular appearing liver consistent with cirrhosis.    2.  Mild splenomegaly and splenic varices are noted consistent with portal hypertension.    3.  Increased renal echogenicity consistent with medical renal disease.    4.  Several cysts are noted in each kidney.     Hepatitis panel was pulled and it was negative.

## 2017-05-25 NOTE — ASSESSMENT & PLAN NOTE
Her granddaughter she has a history of hypertension. She has several bottles of hypertensive medications but currently is only taking one losartan 100 mg daily. That bottle has no medication and it was written in December. During her hospitalization she was given losartan and Norvasc once during hospitalization for elevated BP.

## 2017-05-25 NOTE — ASSESSMENT & PLAN NOTE
This is an 83-year-old female In by her granddaughter and great-grandson. She is here today to establish care. She recently moved here from [place] in [place]. She was recently hospitalized for a few days from May 12 to May 15. She has several chronic medical conditions. First being kidney disease. She is an stage renal disease. She is on dialysis 3 times a week now at [facility]. Recent laboratory showed the following:       Ref. Range 5/18/2017 03:04   Sodium Latest Ref Range: 135-145 mmol/L 133 (L)   Potassium Latest Ref Range: 3.6-5.5 mmol/L 4.5   Chloride Latest Ref Range:  mmol/L 98   Co2 Latest Ref Range: 20-33 mmol/L 22   Anion Gap Latest Ref Range: 0.0-11.9  13.0 (H)   Glucose Latest Ref Range: 65-99 mg/dL 112 (H)   Bun Latest Ref Range: 8-22 mg/dL 74 (HH)   Creatinine Latest Ref Range: 0.50-1.40 mg/dL 5.93 (HH)   GFR If  Latest Ref Range: >60 mL/min/1.73 m 2 8 (A)   GFR If Non  Latest Ref Range: >60 mL/min/1.73 m 2 7 (A)   Calcium Latest Ref Range: 8.5-10.5 mg/dL 9.0   AST(SGOT) Latest Ref Range: 12-45 U/L 20   ALT(SGPT) Latest Ref Range: 2-50 U/L 16   Alkaline Phosphatase Latest Ref Range: 30-99 U/L 97   Total Bilirubin Latest Ref Range: 0.1-1.5 mg/dL 0.6   Albumin Latest Ref Range: 3.2-4.9 g/dL 3.6   Total Protein Latest Ref Range: 6.0-8.2 g/dL 6.4   Globulin Latest Ref Range: 1.9-3.5 g/dL 2.8   A-G Ratio Latest Units: g/dL 1.3     She is not following up with a nephrologist.

## 2017-05-25 NOTE — ASSESSMENT & PLAN NOTE
She has a history of depression. Takes Effexor 37.5 mg at bedtime. Granddaughter states that her mother told her that Ketty woke up the other day and she was crying. There appears to be a concern that she sleeps from 10-3 or 4 daily. She also takes naps during the daytime.

## 2017-05-25 NOTE — ASSESSMENT & PLAN NOTE
During hospitalization she was diagnosed with pancytopenia. Labs showed the following:       Ref. Range 5/18/2017 03:04   WBC Latest Ref Range: 4.8-10.8 K/uL 3.6 (L)   RBC Latest Ref Range: 4.20-5.40 M/uL 3.43 (L)   Hemoglobin Latest Ref Range: 12.0-16.0 g/dL 10.7 (L)   Hematocrit Latest Ref Range: 37.0-47.0 % 32.5 (L)   MCV Latest Ref Range: 81.4-97.8 fL 94.8   MCH Latest Ref Range: 27.0-33.0 pg 31.2   MCHC Latest Ref Range: 33.6-35.0 g/dL 32.9 (L)   RDW Latest Ref Range: 35.9-50.0 fL 47.2   Platelet Count Latest Ref Range: 164-446 K/uL 36 (LL)   MPV Latest Ref Range: 9.0-12.9 fL 12.9     Total iron was 44.

## 2017-10-06 ENCOUNTER — HOSPITAL ENCOUNTER (OUTPATIENT)
Dept: LAB | Facility: MEDICAL CENTER | Age: 82
End: 2017-10-06
Attending: INTERNAL MEDICINE
Payer: MEDICARE

## 2017-10-06 LAB
APPEARANCE UR: CLEAR
BACTERIA #/AREA URNS HPF: ABNORMAL /HPF
BILIRUB UR QL STRIP.AUTO: NEGATIVE
COLOR UR: YELLOW
EPI CELLS #/AREA URNS HPF: ABNORMAL /HPF
GLUCOSE UR STRIP.AUTO-MCNC: 100 MG/DL
HYALINE CASTS #/AREA URNS LPF: ABNORMAL /LPF
KETONES UR STRIP.AUTO-MCNC: NEGATIVE MG/DL
LEUKOCYTE ESTERASE UR QL STRIP.AUTO: ABNORMAL
MICRO URNS: ABNORMAL
NITRITE UR QL STRIP.AUTO: NEGATIVE
PH UR STRIP.AUTO: 7.5 [PH]
PROT UR QL STRIP: 300 MG/DL
RBC # URNS HPF: ABNORMAL /HPF
RBC UR QL AUTO: ABNORMAL
SP GR UR STRIP.AUTO: 1.02
UROBILINOGEN UR STRIP.AUTO-MCNC: 0.2 MG/DL
WBC #/AREA URNS HPF: ABNORMAL /HPF

## 2017-10-06 PROCEDURE — 87086 URINE CULTURE/COLONY COUNT: CPT

## 2017-10-06 PROCEDURE — 81001 URINALYSIS AUTO W/SCOPE: CPT

## 2017-10-08 LAB
BACTERIA UR CULT: NORMAL
SIGNIFICANT IND 70042: NORMAL
SITE SITE: NORMAL
SOURCE SOURCE: NORMAL

## 2017-11-16 ENCOUNTER — OFFICE VISIT (OUTPATIENT)
Dept: URGENT CARE | Facility: CLINIC | Age: 82
End: 2017-11-16
Payer: MEDICARE

## 2017-11-16 VITALS
HEART RATE: 64 BPM | WEIGHT: 106 LBS | TEMPERATURE: 100 F | BODY MASS INDEX: 22.93 KG/M2 | OXYGEN SATURATION: 91 % | SYSTOLIC BLOOD PRESSURE: 190 MMHG | RESPIRATION RATE: 16 BRPM | DIASTOLIC BLOOD PRESSURE: 110 MMHG

## 2017-11-16 DIAGNOSIS — R11.0 NAUSEA: ICD-10-CM

## 2017-11-16 DIAGNOSIS — K29.70 VIRAL GASTRITIS: ICD-10-CM

## 2017-11-16 LAB
FLUAV+FLUBV AG SPEC QL IA: NORMAL
INT CON NEG: POSITIVE
INT CON POS: NEGATIVE

## 2017-11-16 PROCEDURE — 87804 INFLUENZA ASSAY W/OPTIC: CPT | Performed by: NURSE PRACTITIONER

## 2017-11-16 PROCEDURE — 99203 OFFICE O/P NEW LOW 30 MIN: CPT | Performed by: NURSE PRACTITIONER

## 2017-11-16 RX ORDER — CINACALCET 30 MG/1
30 TABLET, FILM COATED ORAL DAILY
COMMUNITY

## 2017-11-16 RX ORDER — ONDANSETRON 4 MG/1
4 TABLET, ORALLY DISINTEGRATING ORAL ONCE
Status: COMPLETED | OUTPATIENT
Start: 2017-11-16 | End: 2017-11-16

## 2017-11-16 RX ORDER — ONDANSETRON 4 MG/1
4 TABLET, ORALLY DISINTEGRATING ORAL EVERY 6 HOURS PRN
Qty: 10 TAB | Refills: 0 | Status: SHIPPED | OUTPATIENT
Start: 2017-11-16

## 2017-11-16 RX ADMIN — ONDANSETRON 4 MG: 4 TABLET, ORALLY DISINTEGRATING ORAL at 18:05

## 2017-11-16 ASSESSMENT — ENCOUNTER SYMPTOMS
SORE THROAT: 0
ABDOMINAL PAIN: 0
HEADACHES: 1
ABNORMAL BEHAVIOR: 0
COUGH: 0
NAUSEA: 1
VOMITING: 0
FEVER: 1

## 2017-11-17 ENCOUNTER — APPOINTMENT (OUTPATIENT)
Dept: RADIOLOGY | Facility: MEDICAL CENTER | Age: 82
End: 2017-11-17
Attending: EMERGENCY MEDICINE
Payer: MEDICARE

## 2017-11-17 ENCOUNTER — RESOLUTE PROFESSIONAL BILLING HOSPITAL PROF FEE (OUTPATIENT)
Dept: HOSPITALIST | Facility: MEDICAL CENTER | Age: 82
End: 2017-11-17
Payer: MEDICARE

## 2017-11-17 ENCOUNTER — HOSPITAL ENCOUNTER (EMERGENCY)
Facility: MEDICAL CENTER | Age: 82
End: 2017-11-17
Attending: EMERGENCY MEDICINE
Payer: MEDICARE

## 2017-11-17 VITALS
RESPIRATION RATE: 18 BRPM | BODY MASS INDEX: 24.19 KG/M2 | WEIGHT: 120 LBS | TEMPERATURE: 99.1 F | OXYGEN SATURATION: 95 % | HEIGHT: 59 IN | SYSTOLIC BLOOD PRESSURE: 215 MMHG | HEART RATE: 80 BPM | DIASTOLIC BLOOD PRESSURE: 122 MMHG

## 2017-11-17 DIAGNOSIS — I62.9 ICB (INTRACRANIAL BLEED) (HCC): ICD-10-CM

## 2017-11-17 DIAGNOSIS — I16.1 HYPERTENSIVE EMERGENCY: ICD-10-CM

## 2017-11-17 LAB
ALBUMIN SERPL BCP-MCNC: 3.7 G/DL (ref 3.2–4.9)
ALBUMIN/GLOB SERPL: 1 G/DL
ALP SERPL-CCNC: 89 U/L (ref 30–99)
ALT SERPL-CCNC: 10 U/L (ref 2–50)
ANION GAP SERPL CALC-SCNC: 14 MMOL/L (ref 0–11.9)
AST SERPL-CCNC: 24 U/L (ref 12–45)
BASOPHILS # BLD AUTO: 0.3 % (ref 0–1.8)
BASOPHILS # BLD: 0.01 K/UL (ref 0–0.12)
BILIRUB SERPL-MCNC: 1.3 MG/DL (ref 0.1–1.5)
BUN SERPL-MCNC: 17 MG/DL (ref 8–22)
CALCIUM SERPL-MCNC: 8.8 MG/DL (ref 8.5–10.5)
CHLORIDE SERPL-SCNC: 91 MMOL/L (ref 96–112)
CO2 SERPL-SCNC: 28 MMOL/L (ref 20–33)
COMMENT 1642: NORMAL
CREAT SERPL-MCNC: 2.32 MG/DL (ref 0.5–1.4)
EOSINOPHIL # BLD AUTO: 0.13 K/UL (ref 0–0.51)
EOSINOPHIL NFR BLD: 4 % (ref 0–6.9)
ERYTHROCYTE [DISTWIDTH] IN BLOOD BY AUTOMATED COUNT: 55 FL (ref 35.9–50)
GFR SERPL CREATININE-BSD FRML MDRD: 20 ML/MIN/1.73 M 2
GLOBULIN SER CALC-MCNC: 3.6 G/DL (ref 1.9–3.5)
GLUCOSE SERPL-MCNC: 130 MG/DL (ref 65–99)
HCT VFR BLD AUTO: 36.9 % (ref 37–47)
HGB BLD-MCNC: 12.3 G/DL (ref 12–16)
IMM GRANULOCYTES # BLD AUTO: 0.01 K/UL (ref 0–0.11)
IMM GRANULOCYTES NFR BLD AUTO: 0.3 % (ref 0–0.9)
LYMPHOCYTES # BLD AUTO: 0.5 K/UL (ref 1–4.8)
LYMPHOCYTES NFR BLD: 15.2 % (ref 22–41)
MCH RBC QN AUTO: 32.3 PG (ref 27–33)
MCHC RBC AUTO-ENTMCNC: 33.3 G/DL (ref 33.6–35)
MCV RBC AUTO: 96.9 FL (ref 81.4–97.8)
MONOCYTES # BLD AUTO: 0.36 K/UL (ref 0–0.85)
MONOCYTES NFR BLD AUTO: 11 % (ref 0–13.4)
MORPHOLOGY BLD-IMP: NORMAL
NEUTROPHILS # BLD AUTO: 2.27 K/UL (ref 2–7.15)
NEUTROPHILS NFR BLD: 69.2 % (ref 44–72)
NRBC # BLD AUTO: 0 K/UL
NRBC BLD AUTO-RTO: 0 /100 WBC
PLATELET # BLD AUTO: 49 K/UL (ref 164–446)
PLATELETS.RETICULATED NFR BLD AUTO: 14.5 K/UL (ref 0.6–13.1)
PMV BLD AUTO: 13.6 FL (ref 9–12.9)
POTASSIUM SERPL-SCNC: 3.4 MMOL/L (ref 3.6–5.5)
PROT SERPL-MCNC: 7.3 G/DL (ref 6–8.2)
RBC # BLD AUTO: 3.81 M/UL (ref 4.2–5.4)
SODIUM SERPL-SCNC: 133 MMOL/L (ref 135–145)
TROPONIN I SERPL-MCNC: 0.62 NG/ML (ref 0–0.04)
WBC # BLD AUTO: 3.3 K/UL (ref 4.8–10.8)

## 2017-11-17 PROCEDURE — 700111 HCHG RX REV CODE 636 W/ 250 OVERRIDE (IP): Performed by: HOSPITALIST

## 2017-11-17 PROCEDURE — 99285 EMERGENCY DEPT VISIT HI MDM: CPT | Performed by: HOSPITALIST

## 2017-11-17 PROCEDURE — 80053 COMPREHEN METABOLIC PANEL: CPT

## 2017-11-17 PROCEDURE — 94760 N-INVAS EAR/PLS OXIMETRY 1: CPT

## 2017-11-17 PROCEDURE — 84484 ASSAY OF TROPONIN QUANT: CPT

## 2017-11-17 PROCEDURE — 85055 RETICULATED PLATELET ASSAY: CPT

## 2017-11-17 PROCEDURE — 96375 TX/PRO/DX INJ NEW DRUG ADDON: CPT

## 2017-11-17 PROCEDURE — 96374 THER/PROPH/DIAG INJ IV PUSH: CPT

## 2017-11-17 PROCEDURE — 700101 HCHG RX REV CODE 250: Performed by: EMERGENCY MEDICINE

## 2017-11-17 PROCEDURE — 85025 COMPLETE CBC W/AUTO DIFF WBC: CPT

## 2017-11-17 PROCEDURE — 99285 EMERGENCY DEPT VISIT HI MDM: CPT

## 2017-11-17 PROCEDURE — 96376 TX/PRO/DX INJ SAME DRUG ADON: CPT

## 2017-11-17 PROCEDURE — 70450 CT HEAD/BRAIN W/O DYE: CPT

## 2017-11-17 RX ORDER — POLYVINYL ALCOHOL 14 MG/ML
2 SOLUTION/ DROPS OPHTHALMIC EVERY 6 HOURS PRN
Status: DISCONTINUED | OUTPATIENT
Start: 2017-11-17 | End: 2017-11-18 | Stop reason: HOSPADM

## 2017-11-17 RX ORDER — ONDANSETRON 4 MG/1
8 TABLET, ORALLY DISINTEGRATING ORAL EVERY 8 HOURS PRN
Status: DISCONTINUED | OUTPATIENT
Start: 2017-11-17 | End: 2017-11-18 | Stop reason: HOSPADM

## 2017-11-17 RX ORDER — MORPHINE SULFATE 10 MG/ML
10 INJECTION, SOLUTION INTRAMUSCULAR; INTRAVENOUS
Status: DISCONTINUED | OUTPATIENT
Start: 2017-11-17 | End: 2017-11-18 | Stop reason: HOSPADM

## 2017-11-17 RX ORDER — ATROPINE SULFATE 10 MG/ML
2 SOLUTION/ DROPS OPHTHALMIC EVERY 4 HOURS PRN
Status: DISCONTINUED | OUTPATIENT
Start: 2017-11-17 | End: 2017-11-18 | Stop reason: HOSPADM

## 2017-11-17 RX ORDER — SCOLOPAMINE TRANSDERMAL SYSTEM 1 MG/1
1 PATCH, EXTENDED RELEASE TRANSDERMAL
Status: DISCONTINUED | OUTPATIENT
Start: 2017-11-17 | End: 2017-11-18 | Stop reason: HOSPADM

## 2017-11-17 RX ORDER — ONDANSETRON 2 MG/ML
8 INJECTION INTRAMUSCULAR; INTRAVENOUS EVERY 8 HOURS PRN
Status: DISCONTINUED | OUTPATIENT
Start: 2017-11-17 | End: 2017-11-18 | Stop reason: HOSPADM

## 2017-11-17 RX ORDER — MORPHINE SULFATE 100 MG/5ML
10 SOLUTION ORAL
Status: DISCONTINUED | OUTPATIENT
Start: 2017-11-17 | End: 2017-11-18 | Stop reason: HOSPADM

## 2017-11-17 RX ORDER — MORPHINE SULFATE 10 MG/ML
5 INJECTION, SOLUTION INTRAMUSCULAR; INTRAVENOUS
Status: DISCONTINUED | OUTPATIENT
Start: 2017-11-17 | End: 2017-11-18 | Stop reason: HOSPADM

## 2017-11-17 RX ORDER — LABETALOL HYDROCHLORIDE 5 MG/ML
10 INJECTION, SOLUTION INTRAVENOUS ONCE
Status: COMPLETED | OUTPATIENT
Start: 2017-11-17 | End: 2017-11-17

## 2017-11-17 RX ORDER — LORAZEPAM 2 MG/ML
1 INJECTION INTRAMUSCULAR
Status: DISCONTINUED | OUTPATIENT
Start: 2017-11-17 | End: 2017-11-18 | Stop reason: HOSPADM

## 2017-11-17 RX ORDER — LORAZEPAM 2 MG/ML
1 CONCENTRATE ORAL
Status: DISCONTINUED | OUTPATIENT
Start: 2017-11-17 | End: 2017-11-18 | Stop reason: HOSPADM

## 2017-11-17 RX ADMIN — LORAZEPAM 1 MG: 2 INJECTION INTRAMUSCULAR; INTRAVENOUS at 20:43

## 2017-11-17 RX ADMIN — LORAZEPAM 1 MG: 2 INJECTION INTRAMUSCULAR; INTRAVENOUS at 18:40

## 2017-11-17 RX ADMIN — LABETALOL HYDROCHLORIDE 10 MG: 5 INJECTION, SOLUTION INTRAVENOUS at 17:19

## 2017-11-17 RX ADMIN — MORPHINE SULFATE 10 MG: 10 INJECTION INTRAVENOUS at 20:43

## 2017-11-17 RX ADMIN — MORPHINE SULFATE 5 MG: 10 INJECTION INTRAVENOUS at 18:40

## 2017-11-17 RX ADMIN — MORPHINE SULFATE 10 MG: 10 INJECTION INTRAVENOUS at 19:35

## 2017-11-17 NOTE — PROGRESS NOTES
Subjective:      Marcie Mccartney is a 83 y.o. female who presents with Headache (loss of appetite, nausea, began today, difficulty sleeping, frontal headache )            Headache    This is a new problem. Episode onset: Family reports onset of a headache today and nausea. She is drinking water but does not feel hungry. She does have ESRD and goes to dialysis 3 days a week. Episode frequency: This afternoon her headache has improved but she continues to feel nauseated. The problem has been gradually improving. The pain is located in the frontal region. The quality of the pain is described as aching. Associated symptoms include a fever (low grade) and nausea. Pertinent negatives include no abdominal pain, abnormal behavior, coughing, sore throat or vomiting. She has tried nothing for the symptoms. (Family denies any sick contacts at home)       Review of Systems   Constitutional: Positive for fever (low grade).   HENT: Negative for sore throat.    Respiratory: Negative for cough.    Gastrointestinal: Positive for nausea. Negative for abdominal pain and vomiting.   Neurological: Positive for headaches.   All other systems reviewed and are negative.    No past medical history on file.   Past Surgical History:   Procedure Laterality Date   • BONE MARROW ASPIRATION  5/15/2017    Procedure: BONE MARROW ASPIRATION;  Surgeon: Daryl Salvador M.D.;  Location: [facility];  Service:    • BONE MARROW BIOPSY, NDL/TROCAR  5/15/2017    Procedure: BONE MARROW BIOPSY, NDL/TROCAR;  Surgeon: Daryl Salvador M.D.;  Location: [facility];  Service:       Social History     Social History   • Marital status: Single     Spouse name: N/A   • Number of children: N/A   • Years of education: N/A     Occupational History   • Not on file.     Social History Main Topics   • Smoking status: Never Smoker   • Smokeless tobacco: Never Used   • Alcohol use No   • Drug use: No   • Sexual activity: No     Other Topics Concern   •  Not on file     Social History Narrative   • No narrative on file          Objective:     BP (!) 190/110   Pulse 64   Temp 37.8 °C (100 °F)   Resp 16   Wt 48.1 kg (106 lb)   SpO2 91%   BMI 22.93 kg/m²      Physical Exam   Constitutional: She is oriented to person, place, and time. Vital signs are normal. She appears well-developed and well-nourished.   HENT:   Head: Normocephalic and atraumatic.   Right Ear: External ear normal.   Left Ear: External ear normal.   Nose: Nose normal.   Eyes: EOM are normal. Pupils are equal, round, and reactive to light.   Neck: Normal range of motion.   Cardiovascular: Normal rate and regular rhythm.    Pulmonary/Chest: Effort normal and breath sounds normal.   Abdominal: Normal appearance. There is no tenderness.   Musculoskeletal: Normal range of motion.   Lymphadenopathy:     She has no cervical adenopathy.   Neurological: She is alert and oriented to person, place, and time.   Skin: Skin is warm and dry. Capillary refill takes less than 2 seconds.   Psychiatric: She has a normal mood and affect. Her speech is normal and behavior is normal. Thought content normal.   Vitals reviewed.              Assessment/Plan:     1. Viral gastritis  - ondansetron (ZOFRAN ODT) dispertab 4 mg; Take 1 Tab by mouth Once.  - ondansetron (ZOFRAN ODT) 4 MG TABLET DISPERSIBLE; Take 1 Tab by mouth every 6 hours as needed for Nausea.  Dispense: 10 Tab; Refill: 0    2. Nausea  - ondansetron (ZOFRAN ODT) dispertab 4 mg; Take 1 Tab by mouth Once.  - ondansetron (ZOFRAN ODT) 4 MG TABLET DISPERSIBLE; Take 1 Tab by mouth every 6 hours as needed for Nausea.  Dispense: 10 Tab; Refill: 0  - POCT Influenza A/B NEGATIVE    BP was re-checked and it lowered to 138/102. Family reports she has dialysis tomorrow morning and I would like for them to record her BP. If it remains elevated, she needs to contact her PCP  Tylenol for headache pain  Strict ER precautions for worsening headache, inability to tolerate PO  fluids or any decline in condition  Supportive care, differential diagnoses, and indications for immediate follow-up discussed with patient.    Pathogenesis of diagnosis discussed including typical length and natural progression.      Instructed to return to  or nearest emergency department if symptoms fail to improve, for any change in condition, further concerns, or new concerning symptoms.  Patient states understanding of the plan of care and discharge instructions.

## 2017-11-18 NOTE — ED PROVIDER NOTES
ED Provider Note    HPI: Patient is a 83-year-old female who presented to the emergency department by ambulance transport November 17, 2017 4:32 PM with a chief complaint of altered level of consciousness.    Patient complain of a headache and blurred vision after dialysis today. She had a relatively acute decompensation of her mental status at home and the paramedics were called. Patient was transported here for further care. Patient is averbal at this time and so I could obtain no further history from the patient. Family member accompanies the patient is really unable to give any further helpful history.    Review of Systems: Cannot be obtained due to presenting condition    Past medical/surgical history: Diabetes hypertension renal failure on dialysis    Medications: Unknown could not be obtained due to presenting condition    Allergies: Unknown could not be obtained due to presenting condition    Social History: Unknown could not be obtained due to presenting condition      Physical exam: Constitutional: Elderly female obtunded and unresponsive  Vital signs: Temperature 99.1 pulse 87 respirations 16 blood pressure 215/122 pulse oximetry 96%  EYES: Pupils 4 millimeters sluggishly reactive bilateral. Patient was not making any spontaneous movements of her eyes I could not assess extraocular movements. Conjunctiva and sclera clear eyelids normal bilaterally  Neck: Trachea midline. No cervical masses seen or palpated. Normal range of motion, supple. No meningeal signs elicited.  Cardiac: Regular rate and rhythm. S1-S2 present. No S3 or S4 present. No murmurs, rubs, or gallops heard. No edema or varicosities were seen.   Lungs: Clear to auscultation with good aeration throughout. No wheezes, rales, or rhonchi heard. Patient's chest wall moved symmetrically with each respiratory effort. Patient was not making use of accessory muscles of respiration in breathing.  Abdomen: Soft nontender to palpation. No rebound or  guarding elicited. No organomegaly identified. No pulsatile abdominal masses identified.   Musculoskeletal:  no  pain with palpitation or movement of muscle, bone or joint , no obvious musculoskeletal deformities identified.  Neurologic: Obtunded with minimal gag reflex making no spontaneous movements. No further neurologic exam possible due to presenting condition  Skin: no rash or lesion seen, no palpable dermatologic lesions identified. Dialysis catheter site looks okay  Psychiatric: Not be obtained due to presenting condition    Medical decision making: Patient given a dose of IV beta blocker by a drip.    Patient taken emergently to CT imaging; intraparenchymal hemorrhages noted left parietal lobe with surrounding edema as well as extensive subdural hemorrhage along the tentorium and falx and left cerebral hemisphere with up to 62 cm in thickness with left to right midline shift and uncal herniation is seen    Neurosurgery was consulted. They did not believe this is a survivable event and no intervention possible    Hospitalist and I both spoke with the family. After discussing the findings with them they like make her comfort care which certainly seems appropriate at this time.    Please note that I consider this patient be critically ill as she will not survive this hospitalization. I spent 35 minutes in direct care of this critically ill patient. This time was spent in evaluating the patient, reviewing test results, discussion with consultants, and foraminal medical record    Impression 1) intercranial bleed  2) hypertensive emergency  3) (critical care greater than 30 minutes    Addendum: I was asked to pronounce the patient. Patient as noted above was managed related to a nonsurvivable intracranial bleed. No respirationsspontaneous cardiac activity no spontaneous movements. Patient pronounced at 11:20 PM

## 2017-11-18 NOTE — CONSULTS
DATE OF SERVICE:  11/17/2017    PHYSICIAN REQUESTING CONSULTATION:  Luis A Chicas MD from the emergency   room.    REASON FOR CONSULTATION:  Intracranial hemorrhage.    HISTORY OF PRESENT ILLNESS:  The patient is an 83-year-old female with history   of end-stage renal disease, on hemodialysis.  Apparently, she complained of   headache, transient vision and was noted to have altered level of   consciousness earlier today after receiving hemodialysis.  The paramedics were   called to her home and she was transferred to the emergency room where CT of   the head revealed a large intracranial hemorrhage.  The patient is obtunded   and is unable to provide any history.  Her daughter and granddaughters are at   the bedside.    REVIEW OF SYSTEMS:  As above, otherwise unable to obtain due to her medical   condition.    PAST MEDICAL HISTORY:  Significant for end-stage renal disease, on   hemodialysis.    PAST SURGICAL HISTORY:  Unable to obtain due to her medical condition.    SOCIAL HISTORY:  Unable to obtain due to her medical condition.    FAMILY HISTORY:  Reviewed, not pertinent to the presenting problem.    HOME MEDICATIONS:  Unknown.    PHYSICAL EXAMINATION:  GENERAL:  On exam, the patient is obtunded.  VITAL SIGNS:  Temperature is 37.3, pulse is 87, respiratory 16, blood pressure   was 215/122, pulse oximetry is 92%.  NEUROLOGIC:  Patient is obtunded.  HEAD AND NECK:  Pupils equal, sluggishly reactive.  Trachea is in the midline.    Neck is supple.  HEART:  Regular rate and rhythm.  Normal S1, S2.  She has grade II systolic   ejection murmur.  No rubs or gallops.  LUNGS:  She has bibasilar rales.  No chest wall tenderness.  ABDOMEN:  Soft, nontender.  Bowel sounds are positive.  No hepatosplenomegaly.  EXTREMITIES:  No edema, no clubbing, no cyanosis.  NEUROLOGIC:  Patient is obtunded.  She withdraws pain on the left side.  SKIN:  No rash.    DIAGNOSTICS:  White blood cell count is 3.3, hemoglobin 12.3, hematocrit  36.9,   platelet count 49.  Sodium is 133, potassium 3.4, chloride 91, bicarbonate is   28, glucose 130, BUN 17, creatinine 2.32.  Troponin I is 0.62.  CT of the   head without contrast revealed intraparenchymal hemorrhage in the left   parietal lobe with surrounding edema, extensive subdural hemorrhage along the   tentorium, falx, and left cerebral hemisphere measuring up to 2 cm in   thickness with significant left to right midline shift and uncal herniation.      ASSESSMENT:  1.  Intracranial hemorrhage likely secondary to hypertension.  2.  Hypertensive emergency.  3.  End-stage renal disease, on hemodialysis.  4.  Leukopenia and thrombocytopenia.  5.  Elevated troponin likely secondary to her renal failure and likely demand   ischemia.  6.  Hypokalemia.  7.  Hyponatremia.    PLAN:  Unfortunately, the patient has suffered an extensive intraparenchymal   hemorrhage and is showing signs of brain edema with significant left to right   midline shift and uncal herniation.  Dr. Bajwa from neurosurgery was   consulted by Dr. Chicas.  He reviewed her CAT scan and felt that no surgical   intervention is possible and that this is likely to be survivable event.    PLAN:  I had a long discussion with the patient's daughter and granddaughters,   reviewed with them the CT scan findings and discussed treatment options   including intubation, admission to the intensive care unit and maximum   supportive care with blood pressure control, hypertonic saline and close   clinical monitoring.  I reviewed with them despite maximum supportive   measures, patient's prognosis is extremely guarded and she is unlikely to   survive this event and if she does, she will have significant neurologic   deficits.  After multiple discussions, the patient's daughter elected to   proceed with comfort care measures only, which were discussed and will be   initiated.    Thank you for this consultation.       ____________________________________      MD JUSTIN WATT / VIDAL    DD:  11/17/2017 22:51:08  DT:  11/17/2017 23:53:56    D#:  [id number]  Job#:  [id number]

## 2017-11-18 NOTE — ED NOTES
Tech from Lab called with critical result of [place] of 49 at 1811. Critical lab result read back to Tech.   Dr. Chicas notified of critical lab result at 1811.  Critical lab result read back by Dr. Chicas.

## 2017-11-18 NOTE — DISCHARGE PLANNING
Medical Social Work     SW received a call and was advised that the pt had . SW went to pt room and provided emotional support for the family. SW also provided the difficult time packet in english and [place]ic to the family. SW will remain available for pt support.
